# Patient Record
Sex: FEMALE | Race: WHITE | Employment: OTHER | URBAN - METROPOLITAN AREA
[De-identification: names, ages, dates, MRNs, and addresses within clinical notes are randomized per-mention and may not be internally consistent; named-entity substitution may affect disease eponyms.]

---

## 2019-06-08 ENCOUNTER — APPOINTMENT (OUTPATIENT)
Dept: GENERAL RADIOLOGY | Age: 84
End: 2019-06-08
Payer: MEDICARE

## 2019-06-08 ENCOUNTER — HOSPITAL ENCOUNTER (EMERGENCY)
Age: 84
Discharge: LEFT BEFORE DISCHARGE | End: 2019-06-08
Payer: MEDICARE

## 2019-06-08 VITALS
BODY MASS INDEX: 19.72 KG/M2 | SYSTOLIC BLOOD PRESSURE: 153 MMHG | HEIGHT: 67 IN | OXYGEN SATURATION: 100 % | DIASTOLIC BLOOD PRESSURE: 56 MMHG | TEMPERATURE: 97.8 F | WEIGHT: 125.66 LBS | RESPIRATION RATE: 8 BRPM | HEART RATE: 58 BPM

## 2019-06-08 DIAGNOSIS — R07.9 CHEST PAIN, UNSPECIFIED TYPE: Primary | ICD-10-CM

## 2019-06-08 LAB
A/G RATIO: 1.5 (ref 1.1–2.2)
ALBUMIN SERPL-MCNC: 4 G/DL (ref 3.4–5)
ALP BLD-CCNC: 75 U/L (ref 40–129)
ALT SERPL-CCNC: 26 U/L (ref 10–40)
ANION GAP SERPL CALCULATED.3IONS-SCNC: 11 MMOL/L (ref 3–16)
AST SERPL-CCNC: 26 U/L (ref 15–37)
BASOPHILS ABSOLUTE: 0.1 K/UL (ref 0–0.2)
BASOPHILS RELATIVE PERCENT: 0.9 %
BILIRUB SERPL-MCNC: 0.7 MG/DL (ref 0–1)
BILIRUBIN URINE: NEGATIVE
BLOOD, URINE: NEGATIVE
BUN BLDV-MCNC: 15 MG/DL (ref 7–20)
CALCIUM SERPL-MCNC: 9.6 MG/DL (ref 8.3–10.6)
CHLORIDE BLD-SCNC: 108 MMOL/L (ref 99–110)
CLARITY: CLEAR
CO2: 23 MMOL/L (ref 21–32)
COLOR: YELLOW
CREAT SERPL-MCNC: 1 MG/DL (ref 0.6–1.2)
EKG ATRIAL RATE: 62 BPM
EKG DIAGNOSIS: NORMAL
EKG P AXIS: 73 DEGREES
EKG P-R INTERVAL: 188 MS
EKG Q-T INTERVAL: 412 MS
EKG QRS DURATION: 90 MS
EKG QTC CALCULATION (BAZETT): 418 MS
EKG R AXIS: 31 DEGREES
EKG T AXIS: 63 DEGREES
EKG VENTRICULAR RATE: 62 BPM
EOSINOPHILS ABSOLUTE: 0.3 K/UL (ref 0–0.6)
EOSINOPHILS RELATIVE PERCENT: 3.9 %
EPITHELIAL CELLS, UA: 0 /HPF (ref 0–5)
GFR AFRICAN AMERICAN: >60
GFR NON-AFRICAN AMERICAN: 52
GLOBULIN: 2.7 G/DL
GLUCOSE BLD-MCNC: 89 MG/DL (ref 70–99)
GLUCOSE URINE: NEGATIVE MG/DL
HCT VFR BLD CALC: 35.8 % (ref 36–48)
HEMOGLOBIN: 11.7 G/DL (ref 12–16)
HYALINE CASTS: 1 /LPF (ref 0–8)
KETONES, URINE: NEGATIVE MG/DL
LEUKOCYTE ESTERASE, URINE: ABNORMAL
LYMPHOCYTES ABSOLUTE: 0.9 K/UL (ref 1–5.1)
LYMPHOCYTES RELATIVE PERCENT: 13.5 %
MCH RBC QN AUTO: 31.8 PG (ref 26–34)
MCHC RBC AUTO-ENTMCNC: 32.5 G/DL (ref 31–36)
MCV RBC AUTO: 97.8 FL (ref 80–100)
MICROSCOPIC EXAMINATION: YES
MONOCYTES ABSOLUTE: 0.5 K/UL (ref 0–1.3)
MONOCYTES RELATIVE PERCENT: 7.5 %
NEUTROPHILS ABSOLUTE: 5.2 K/UL (ref 1.7–7.7)
NEUTROPHILS RELATIVE PERCENT: 74.2 %
NITRITE, URINE: NEGATIVE
PDW BLD-RTO: 15.1 % (ref 12.4–15.4)
PH UA: 7 (ref 5–8)
PLATELET # BLD: 338 K/UL (ref 135–450)
PMV BLD AUTO: 7.9 FL (ref 5–10.5)
POTASSIUM SERPL-SCNC: 4.1 MMOL/L (ref 3.5–5.1)
PROTEIN UA: NEGATIVE MG/DL
RBC # BLD: 3.66 M/UL (ref 4–5.2)
RBC UA: 1 /HPF (ref 0–4)
SODIUM BLD-SCNC: 142 MMOL/L (ref 136–145)
SPECIFIC GRAVITY UA: 1.01 (ref 1–1.03)
TOTAL PROTEIN: 6.7 G/DL (ref 6.4–8.2)
TROPONIN: <0.01 NG/ML
URINE REFLEX TO CULTURE: YES
URINE TYPE: ABNORMAL
UROBILINOGEN, URINE: 0.2 E.U./DL
WBC # BLD: 7 K/UL (ref 4–11)
WBC UA: 10 /HPF (ref 0–5)

## 2019-06-08 PROCEDURE — 85025 COMPLETE CBC W/AUTO DIFF WBC: CPT

## 2019-06-08 PROCEDURE — 36415 COLL VENOUS BLD VENIPUNCTURE: CPT

## 2019-06-08 PROCEDURE — 87086 URINE CULTURE/COLONY COUNT: CPT

## 2019-06-08 PROCEDURE — 93010 ELECTROCARDIOGRAM REPORT: CPT | Performed by: INTERNAL MEDICINE

## 2019-06-08 PROCEDURE — 99285 EMERGENCY DEPT VISIT HI MDM: CPT

## 2019-06-08 PROCEDURE — 6370000000 HC RX 637 (ALT 250 FOR IP): Performed by: PHYSICIAN ASSISTANT

## 2019-06-08 PROCEDURE — 81001 URINALYSIS AUTO W/SCOPE: CPT

## 2019-06-08 PROCEDURE — 80053 COMPREHEN METABOLIC PANEL: CPT

## 2019-06-08 PROCEDURE — 84484 ASSAY OF TROPONIN QUANT: CPT

## 2019-06-08 PROCEDURE — 71046 X-RAY EXAM CHEST 2 VIEWS: CPT

## 2019-06-08 PROCEDURE — 93005 ELECTROCARDIOGRAM TRACING: CPT | Performed by: PHYSICIAN ASSISTANT

## 2019-06-08 RX ORDER — ASPIRIN 81 MG/1
81 TABLET, CHEWABLE ORAL NIGHTLY
COMMUNITY

## 2019-06-08 RX ORDER — NITROGLYCERIN 0.4 MG/1
0.4 TABLET SUBLINGUAL EVERY 5 MIN PRN
Status: DISCONTINUED | OUTPATIENT
Start: 2019-06-08 | End: 2019-06-08 | Stop reason: HOSPADM

## 2019-06-08 RX ORDER — PILOCARPINE HYDROCHLORIDE 20 MG/ML
1 SOLUTION/ DROPS OPHTHALMIC 2 TIMES DAILY
COMMUNITY
Start: 2019-04-22

## 2019-06-08 RX ORDER — DORZOLAMIDE HYDROCHLORIDE AND TIMOLOL MALEATE 20; 5 MG/ML; MG/ML
1 SOLUTION/ DROPS OPHTHALMIC 2 TIMES DAILY
COMMUNITY
Start: 2019-04-22

## 2019-06-08 RX ORDER — PYRIDOXINE HCL (VITAMIN B6) 100 MG
100 TABLET ORAL DAILY
COMMUNITY

## 2019-06-08 RX ORDER — M-VIT,TX,IRON,MINS/CALC/FOLIC 27MG-0.4MG
1 TABLET ORAL DAILY
COMMUNITY
End: 2022-01-24 | Stop reason: ALTCHOICE

## 2019-06-08 RX ORDER — MULTIVIT WITH MINERALS/LUTEIN
1000 TABLET ORAL DAILY
COMMUNITY

## 2019-06-08 RX ORDER — LATANOPROST 50 UG/ML
1 SOLUTION/ DROPS OPHTHALMIC NIGHTLY
COMMUNITY

## 2019-06-08 RX ORDER — ASPIRIN 81 MG/1
243 TABLET, CHEWABLE ORAL ONCE
Status: COMPLETED | OUTPATIENT
Start: 2019-06-08 | End: 2019-06-08

## 2019-06-08 RX ADMIN — ASPIRIN 81 MG 243 MG: 81 TABLET ORAL at 11:16

## 2019-06-08 RX ADMIN — NITROGLYCERIN 0.4 MG: 0.4 TABLET, ORALLY DISINTEGRATING SUBLINGUAL at 11:56

## 2019-06-08 ASSESSMENT — ENCOUNTER SYMPTOMS
CHOKING: 0
SORE THROAT: 0
VOMITING: 0
NAUSEA: 0
ABDOMINAL PAIN: 0
SHORTNESS OF BREATH: 0
FACIAL SWELLING: 0
BACK PAIN: 0
APNEA: 0
EYE DISCHARGE: 0
EYE REDNESS: 0

## 2019-06-08 ASSESSMENT — PAIN SCALES - GENERAL
PAINLEVEL_OUTOF10: 4
PAINLEVEL_OUTOF10: 4
PAINLEVEL_OUTOF10: 0

## 2019-06-08 ASSESSMENT — PAIN DESCRIPTION - PAIN TYPE
TYPE: ACUTE PAIN
TYPE: ACUTE PAIN

## 2019-06-08 ASSESSMENT — PAIN DESCRIPTION - LOCATION
LOCATION: CHEST
LOCATION: CHEST

## 2019-06-08 ASSESSMENT — PAIN DESCRIPTION - ONSET
ONSET: ON-GOING
ONSET: ON-GOING

## 2019-06-08 ASSESSMENT — PAIN DESCRIPTION - DESCRIPTORS
DESCRIPTORS: PRESSURE
DESCRIPTORS: PRESSURE

## 2019-06-08 ASSESSMENT — PAIN DESCRIPTION - FREQUENCY
FREQUENCY: CONTINUOUS
FREQUENCY: CONTINUOUS

## 2019-06-08 ASSESSMENT — PAIN DESCRIPTION - PROGRESSION
CLINICAL_PROGRESSION: NOT CHANGED
CLINICAL_PROGRESSION: NOT CHANGED

## 2019-06-08 NOTE — ED TRIAGE NOTES
Pt to ED with c/o chest pain. States she woke up to go to the restroom and her chest was hurting. Describes the pain as pressure. Denies cardiac history, denies n/v/d, denies numbness/tingling anywhere.

## 2019-06-08 NOTE — LETTER
[facility] Emergency Department  [address] [place] [id number]  Phone: [phone]    Patient: Shweta Cowan  YOB: 1932  Date: 6/8/2019 Time: 3:43 PM    Leaving the Hospital Against Medical Advice    Chart #:[id number]    This will certify that I, the undersigned,    ______________________________________________________________________    A patient in the above named medical center, having requested discharge and removal from the medical center against the advice of my attending physician(s), hereby release the Emergency Department, its physicians, officers and employees, severally and individually, from any and all liability of any nature whatsoever for any injury or harm or complication of any kind that may result directly or indirectly, by reason of my terminating my stay as a patient from [facility], and hereby waive any and all rights of action I may now have or later acquire as a result of my voluntary departure from [facility] and the termination of my stay as a patient therein. This release is made with the full knowledge of the danger that may result from the action which I am taking.       Date:_______________________                         ___________________________                                                                                    Patient/Legal Representative    Witness:        ____________________________                          ___________________________  Nurse                                                                        Physician

## 2019-06-08 NOTE — ED PROVIDER NOTES
[facility]      Pt Name: Tamika Gamboa  XKD:[id number]  Birthdate 3/18/1932  Date of evaluation: 6/8/2019  Provider: Ellen Barahona PA-C     This patient was seen and evaluated by the attending physician Dr. Bettye Heimlich, M.D. Chief Complaint:    Chief Complaint   Patient presents with    Chest Pain     states she woke up to use the restroom and had chest pain/pressure       Nursing Notes, Past Medical Hx, Past Surgical Hx, Social Hx, Allergies, and Family Hx were all reviewed and agreed with or any disagreements were addressed in the HPI.    HPI:  (Location, Duration, Timing, Severity,Quality, Assoc Sx, Context, Modifying factors)  This is a  80 y.o. female complaining of chest pressure. States she woke up to use the restroom her pressure started. No nausea vomiting associated with no abdominal pain. Pressure does not go straight to her back. Does not radiate down her arm into her neck. No diaphoresis associated with it. Patient denies a previous cardiac history, no history of stroke. Denies history of high blood pressure diabetes. No high cholesterol. She does take a baby aspirin a day. She did take one this morning. No headaches, no extremity weakness. No other complaints.       PastMedical/Surgical History:      Diagnosis Date    Glaucoma          Procedure Laterality Date    ANTERIOR CRUCIATE LIGAMENT REPAIR Right     APPENDECTOMY      CHOLECYSTECTOMY      COLON SURGERY  1989    EYE SURGERY      cataract       Medications:  Previous Medications    ASCORBIC ACID (VITAMIN C) 1000 MG TABLET    Take by mouth daily    ASPIRIN 81 MG CHEWABLE TABLET    Take 81 mg by mouth daily    BIOTIN PO    Take by mouth    CALCIUM-VITAMIN D PO    Take by mouth daily    CYANOCOBALAMIN 1000 MCG TABLET    Take 1,000 mcg by mouth daily    DORZOLAMIDE-TIMOLOL (COSOPT) 22.3-6.8 MG/ML OPHTHALMIC SOLUTION    Place into both eyes 2 times daily 5 drops in the right eye and 2 drops in the left eye    LATANOPROST (XALATAN) 0.005 % OPHTHALMIC SOLUTION    Place into both eyes nightly     MULTIPLE VITAMINS-MINERALS (THERAPEUTIC MULTIVITAMIN-MINERALS) TABLET    Take 1 tablet by mouth daily    MULTIPLE VITAMINS-MINERALS (VITEYES AREDS FORMULA) CAPS    Take by mouth    OMEGA-3 FATTY ACIDS PO    Take by mouth daily    PILOCARPINE (PILOCAR) 2 % OPHTHALMIC SOLUTION    Place into both eyes 2 times daily    PYRIDOXINE (B-6) 100 MG TABLET    Take 100 mg by mouth daily    VITAMIN D (CHOLECALCIFEROL) 1000 UNITS TABS TABLET    Take 1,000 Units by mouth daily         Review of Systems:  Review of Systems   Constitutional: Negative for chills and fever. HENT: Negative for congestion, facial swelling and sore throat. Eyes: Negative for discharge and redness. Respiratory: Negative for apnea, choking and shortness of breath. Cardiovascular: Positive for chest pain. Gastrointestinal: Negative for abdominal pain, nausea and vomiting. Genitourinary: Negative for dysuria. Musculoskeletal: Negative for back pain, neck pain and neck stiffness. Neurological: Negative for dizziness, tremors, seizures, weakness and headaches. All other systems reviewed and are negative. Positives and Pertinent negatives as per HPI. Except as noted above in the ROS, problem specific ROS was completed and is negative. Physical Exam:  Physical Exam   Constitutional: She is oriented to person, place, and time. She appears well-developed and well-nourished. HENT:   Head: Normocephalic and atraumatic. Nose: Nose normal.   Eyes: Right eye exhibits no discharge. Left eye exhibits no discharge. Neck: Normal range of motion. Neck supple. Cardiovascular: Normal rate, regular rhythm and normal heart sounds. Exam reveals no gallop and no friction rub. No murmur heard. Pulmonary/Chest: Effort normal and breath sounds normal. No respiratory distress. She has no wheezes.  She has no rales. She exhibits no tenderness. Abdominal: Soft. Bowel sounds are normal. She exhibits no distension and no mass. There is no tenderness. There is no guarding. Musculoskeletal: Normal range of motion. Neurological: She is alert and oriented to person, place, and time. Skin: Skin is warm and dry. She is not diaphoretic. Psychiatric: She has a normal mood and affect. Her behavior is normal.   Nursing note and vitals reviewed.       MEDICAL DECISION MAKING    Vitals:    Vitals:    06/08/19 1341 06/08/19 1401 06/08/19 1441 06/08/19 1455   BP: (!) 152/56 (!) 163/63 (!) 158/61    Pulse: 55 60 59 61   Resp: 19 15 8 10   Temp:       TempSrc:       SpO2: 100% 97% 100% 100%   Weight:       Height:           LABS:  Labs Reviewed   CBC WITH AUTO DIFFERENTIAL - Abnormal; Notable for the following components:       Result Value    RBC 3.66 (*)     Hemoglobin 11.7 (*)     Hematocrit 35.8 (*)     Lymphocytes # 0.9 (*)     All other components within normal limits    Narrative:     Performed at:  [address] [place] [address]   Phone [phone]   COMPREHENSIVE METABOLIC PANEL - Abnormal; Notable for the following components:    GFR Non- 52 (*)     All other components within normal limits    Narrative:     Performed at:  [address] [place] [address]   Phone [phone]   URINE RT REFLEX TO CULTURE - Abnormal; Notable for the following components:    Leukocyte Esterase, Urine SMALL (*)     All other components within normal limits    Narrative:     Performed at:  [address] [place] [address]   Phone [phone]   MICROSCOPIC URINALYSIS - Abnormal; Notable for the following components:    WBC, UA 10 (*)     All other components within normal limits    Narrative:     Performed at:  [facility]  [address] [place], [place] [address]   Phone [phone]   URINE CULTURE   TROPONIN    Narrative:     Performed at:  [facility]  [address] [place], [place] [address]   Phone [phone] of labs reviewed and werenegative at this time or not returned at the time of this note. RADIOLOGY:   Non-plain film images such as CT, Ultrasound and MRI are read by the radiologist. Jade Easton PA-C have directly visualized the radiologic plain film image(s) with the below findings:        Interpretation per the Radiologist below, if available at the time of thisnote:    XR CHEST STANDARD (2 VW)   Final Result   COPD without acute disease. No results found. MEDICAL DECISION MAKING / ED COURSE:      PROCEDURES:   Procedures    None    Patient was given:  Medications   nitroGLYCERIN (NITROSTAT) SL tablet 0.4 mg (0.4 mg Sublingual Given 6/8/19 1156)   aspirin chewable tablet 243 mg (243 mg Oral Given 6/8/19 1116)       Emergency room course: Patient on exam throat is clear without erythematous or exudative. Pupils equal round and reactive to light S or ocular movement is intact. Neck is supple full range of motion without tenderness. No midline tenderness cervical, thoracic or lumbar spine. Cardiovascular regular rate and rhythm without rub murmur or gallop. Lungs are clear no wheeze, rales or rhonchi. Mild reproducible chest wall tenderness upper chest wall. No bruising noted on the chest wall. The step-off or crepitus noted. Abdomen soft nontender. Normal bowel sounds all 4 quadrants nondistended. No CVA or flank tenderness. No palpable mass. No bruits noted with auscultation. Full range of motion all extremity. Neurologically there is no motor or sensory deficit noted. No focal deficits. She is alert and oriented ×4. Does not appear to be in acute distress.       Patient was given nitroglycerin here in the ED for the tightness in her chest. After one dose that did relieve the tightness. Urinalysis is negative for nitrites does show small leukocytes negative for blood and for ketones. Microscopically WBC of 10, RBC of one epithelial cells of zero and I like as of one. She is not having urinary symptoms so I will wait for cultures to treat. CBC has a white count 7.0, RBC 3.66, hemoglobin 11.7 and hematocrit 35.8 CMP within normal limits with a BUN of 15 and creatinine 1.0. Normal transaminases. Troponin less than 0.01. Chest x-ray shows COPD without acute disease    At this time since the nitroglycerin did help with the patient chest pressure I did recommend the patient stay to be admitted for cardiac rule out. The patient refuses. She talked to her son try to get her to stay as well but she refused again. I gave her a few minutes to think about it she talked to her daughter and she still refuses. At this time I informed the patient she was to sign out against medical advise. I will still give her cardiology referral. Instructed to return for any increased chest pain or tightness. She states she would. Her son said that they'll be there watching her. Patient was aware of all the risks. 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 The patient tolerated their visit well. I evaluated the patient. The physician was available for consultation as needed. The patient and / or the family were informed of the results of anytests, a time was given to answer questions, a plan was proposed and they agreed with plan. CLINICAL IMPRESSION:  1.  Chest pain, unspecified type        DISPOSITION  DISPOSITION [place] 06/08/2019 03:14:46 PM          PATIENT REFERRED TO:  Cornell Gordon MD  [facility] [address] [facility],[address]  [phone]    Call in 2 days      Chasity Tomlin MD  [address] [facility] Lyssa Brasher [address] [place] [address]  [phone]    Call in 2 days        DISCHARGE MEDICATIONS:  New Prescriptions    No medications on file       DISCONTINUED MEDICATIONS:  Discontinued Medications    No medications on file              (Please note the MDM and HPI sections of this note were completed with a voice recognition program.  Efforts weremade to edit the dictations but occasionally words are mis-transcribed.)    Electronically signed, Ellen Barahona PA-C,          Ellen Barahona PA-C  06/08/19 6395

## 2019-06-08 NOTE — ED NOTES
Patient ambulated to bathroom and back with steady gait. Patient tolerated well. Urine specimen obtained and sent to lab.      Bianca Gasca RN  06/08/19 [id number]

## 2019-06-08 NOTE — ED PROVIDER NOTES
note were completed with a voice recognition program.  Efforts were made to edit the dictations but occasionally words are mis-transcribed.)    Ruma Nichole MD  Attending Emergency Physician        Bette Humphreys MD  06/08/19 7453

## 2019-06-08 NOTE — PROGRESS NOTES
Pharmacy Medication Reconciliation Note     List of medications patient is currently taking is complete. Source of information:   1. Patient   2. EMR    Notes regarding home medications:   1. Patient received some of her morning home medication doses before presenting to the ER.       [facility] [place], Pharmacy Intern  6/8/2019 12:59 PM

## 2019-06-09 LAB — URINE CULTURE, ROUTINE: NORMAL

## 2019-06-10 DIAGNOSIS — R07.9 CHEST PAIN, UNSPECIFIED TYPE: Primary | ICD-10-CM

## 2019-06-11 ENCOUNTER — HOSPITAL ENCOUNTER (OUTPATIENT)
Dept: NON INVASIVE DIAGNOSTICS | Age: 84
Discharge: HOME | End: 2019-06-11
Payer: MEDICARE

## 2019-06-11 DIAGNOSIS — R07.9 CHEST PAIN, UNSPECIFIED TYPE: ICD-10-CM

## 2019-06-11 LAB
LV EF: 68 %
LVEF MODALITY: NORMAL

## 2019-06-11 PROCEDURE — 93017 CV STRESS TEST TRACING ONLY: CPT

## 2019-06-11 PROCEDURE — 3430000000 HC RX DIAGNOSTIC RADIOPHARMACEUTICAL: Performed by: INTERNAL MEDICINE

## 2019-06-11 PROCEDURE — 78452 HT MUSCLE IMAGE SPECT MULT: CPT

## 2019-06-11 PROCEDURE — A9502 TC99M TETROFOSMIN: HCPCS | Performed by: INTERNAL MEDICINE

## 2019-06-11 RX ADMIN — TETROFOSMIN 30 MILLICURIE: 1.38 INJECTION, POWDER, LYOPHILIZED, FOR SOLUTION INTRAVENOUS at 10:36

## 2019-06-11 RX ADMIN — TETROFOSMIN 10 MILLICURIE: 1.38 INJECTION, POWDER, LYOPHILIZED, FOR SOLUTION INTRAVENOUS at 09:06

## 2019-06-28 ENCOUNTER — OFFICE VISIT (OUTPATIENT)
Dept: CARDIOLOGY CLINIC | Age: 84
End: 2019-06-28
Payer: MEDICARE

## 2019-06-28 VITALS
HEART RATE: 72 BPM | HEIGHT: 67 IN | SYSTOLIC BLOOD PRESSURE: 162 MMHG | WEIGHT: 122 LBS | BODY MASS INDEX: 19.15 KG/M2 | DIASTOLIC BLOOD PRESSURE: 88 MMHG | OXYGEN SATURATION: 94 %

## 2019-06-28 DIAGNOSIS — R07.9 CHEST PAIN, UNSPECIFIED TYPE: Primary | ICD-10-CM

## 2019-06-28 DIAGNOSIS — R03.0 ELEVATED BLOOD PRESSURE READING: ICD-10-CM

## 2019-06-28 PROCEDURE — G8420 CALC BMI NORM PARAMETERS: HCPCS | Performed by: INTERNAL MEDICINE

## 2019-06-28 PROCEDURE — 1090F PRES/ABSN URINE INCON ASSESS: CPT | Performed by: INTERNAL MEDICINE

## 2019-06-28 PROCEDURE — 93000 ELECTROCARDIOGRAM COMPLETE: CPT | Performed by: INTERNAL MEDICINE

## 2019-06-28 PROCEDURE — 99204 OFFICE O/P NEW MOD 45 MIN: CPT | Performed by: INTERNAL MEDICINE

## 2019-06-28 PROCEDURE — G8427 DOCREV CUR MEDS BY ELIG CLIN: HCPCS | Performed by: INTERNAL MEDICINE

## 2019-06-28 NOTE — PATIENT INSTRUCTIONS
to do what your doctor recommends, such as going to all suggested follow-up appointments and taking medicines exactly as directed. This will help you recover and help prevent future problems. How can you care for yourself at home? · Rest until you feel better. · Take your medicine exactly as prescribed. Call your doctor if you think you are having a problem with your medicine. · Do not drive after taking a prescription pain medicine. When should you call for help? Call 911 if:    · You passed out (lost consciousness).     · You have severe difficulty breathing.     · You have symptoms of a heart attack. These may include:  ? Chest pain or pressure, or a strange feeling in your chest.  ? Sweating. ? Shortness of breath. ? Nausea or vomiting. ? Pain, pressure, or a strange feeling in your back, neck, jaw, or upper belly or in one or both shoulders or arms. ? Lightheadedness or sudden weakness. ? A fast or irregular heartbeat. After you call 911, the  may tell you to chew 1 adult-strength or 2 to 4 low-dose aspirin. Wait for an ambulance. Do not try to drive yourself.    Call your doctor today if:    · You have any trouble breathing.     · Your chest pain gets worse.     · You are dizzy or lightheaded, or you feel like you may faint.     · You are not getting better as expected.     · You are having new or different chest pain. Where can you learn more? Go to https://Vita Products.[employer]. org and sign in to your [employer] account. Enter A120 in the [employer] box to learn more about \"Chest Pain: Care Instructions. \"     If you do not have an account, please click on the \"Sign Up Now\" link. Current as of: September 23, 2018  Content Version: 12.0  © 4674-3467 Healthwise, Incorporated. Care instructions adapted under license by [facility] ([facility]).  If you have questions about a medical condition or this instruction, always ask your healthcare professional. Jonathon Calderon disclaims any warranty or liability for your use of this information. Patient Education        Heart-Healthy Diet: Care Instructions  Your Care Instructions    A heart-healthy diet has lots of vegetables, fruits, nuts, beans, and whole grains, and is low in salt. It limits foods that are high in saturated fat, such as meats, cheeses, and fried foods. It may be hard to change your diet, but even small changes can lower your risk of heart attack and heart disease. Follow-up care is a key part of your treatment and safety. Be sure to make and go to all appointments, and call your doctor if you are having problems. It's also a good idea to know your test results and keep a list of the medicines you take. How can you care for yourself at home? Watch your portions  · Learn what a serving is. A \"serving\" and a \"portion\" are not always the same thing. Make sure that you are not eating larger portions than are recommended. For example, a serving of pasta is ½ cup. A serving size of meat is 2 to 3 ounces. A 3-ounce serving is about the size of a deck of cards. Measure serving sizes until you are good at [place]" them. Keep in mind that restaurants often serve portions that are 2 or 3 times the size of one serving. · To keep your energy level up and keep you from feeling hungry, eat often but in smaller portions. · Eat only the number of calories you need to stay at a healthy weight. If you need to lose weight, eat fewer calories than your body burns (through exercise and other physical activity). Eat more fruits and vegetables  · Eat a variety of fruit and vegetables every day. Dark green, deep orange, red, or yellow fruits and vegetables are especially good for you. Examples include spinach, carrots, peaches, and berries. · Keep carrots, celery, and other veggies handy for snacks. Buy fruit that is in season and store it where you can see it so that you will be tempted to eat it.   · Cook dishes that have a lot of veggies in them, such as stir-fries and soups. Limit saturated and trans fat  · Read food labels, and try to avoid saturated and trans fats. They increase your risk of heart disease. Trans fat is found in many processed foods such as cookies and crackers. · Use olive or canola oil when you cook. Try cholesterol-lowering spreads, such as Benecol or Take Control. · Bake, broil, grill, or steam foods instead of frying them. · Choose lean meats instead of high-fat meats such as hot dogs and sausages. Cut off all visible fat when you prepare meat. · Eat fish, skinless poultry, and meat alternatives such as soy products instead of high-fat meats. Soy products, such as tofu, may be especially good for your heart. · Choose low-fat or fat-free milk and dairy products. Eat fish  · Eat at least two servings of fish a week. Certain fish, such as salmon and tuna, contain omega-3 fatty acids, which may help reduce your risk of heart attack. Eat foods high in fiber  · Eat a variety of grain products every day. Include whole-grain foods that have lots of fiber and nutrients. Examples of whole-grain foods include oats, whole wheat bread, and brown rice. · Buy whole-grain breads and cereals, instead of white bread or pastries. Limit salt and sodium  · Limit how much salt and sodium you eat to help lower your blood pressure. · Taste food before you salt it. Add only a little salt when you think you need it. With time, your taste buds will adjust to less salt. · Eat fewer snack items, fast foods, and other high-salt, processed foods. Check food labels for the amount of sodium in packaged foods. · Choose low-sodium versions of canned goods (such as soups, vegetables, and beans). Limit sugar  · Limit drinks and foods with added sugar. These include candy, desserts, and soda pop. Limit alcohol  · Limit alcohol to no more than 2 drinks a day for men and 1 drink a day for women.  Too much alcohol can cause health problems. When should you call for help? Watch closely for changes in your health, and be sure to contact your doctor if:    · You would like help planning heart-healthy meals. Where can you learn more? Go to https://Space Racechristineb.Miira. org and sign in to your [employer] account. Enter V137 in the [facility] box to learn more about \"Heart-Healthy Diet: Care Instructions. \"     If you do not have an account, please click on the \"Sign Up Now\" link. Current as of: July 22, 2018  Content Version: 12.0  © 7727-0497 Healthwise, Incorporated. Care instructions adapted under license by [facility] ([facility]). If you have questions about a medical condition or this instruction, always ask your healthcare professional. [address] any warranty or liability for your use of this information.

## 2019-06-28 NOTE — LETTER
[facility] [address] [place] [address]  Phone: [phone]  Fax: [phone]            June 28, 2019       Patient: Lucrecia Tan   MR Number: [id number]   YOB: 1932   Date of Visit: 6/28/2019       Dear Dr. Lindsey Arriaga: Thank you for the request for consultation for Yuliana Jaylen to me. Below are the relevant portions of my assessment and plan of care. The patient is 80 y.o. female with no past medical history. She presented to the ED on 6/8/19 with chest pain and presents today for follow up. She states when she presented to the ED she was having chest pressure and tightness in her back. She woke up with this pain. She was given a nitro in the ED and this did not help her pain much. She states the pain lasted for a couple days. She does play golf twice per week. She will walk 9 holes one of the nights she plays. She denies chest pain with this exertion. She denies experiencing any chest pain during her previous stress test.     She has been monitoring her blood pressure at home for borderline hypertension. She has not been placed on any medication for this as of yet. Review of Systems:  Constitutional: No fatigue, weakness, night sweats or fever. HEENT: No new vision difficulties or ringing in the ears. Respiratory: No new SOB, PND, orthopnea or cough. Cardiovascular: See HPI   GI: No n/v, diarrhea, constipation, abdominal pain or changes in bowel habits. No melena, no hematochezia  : No urinary frequency, urgency, incontinence, hematuria or dysuria. Skin: No cyanosis or skin lesions. Musculoskeletal: No new muscle or joint pain. Neurological: No syncope or TIA-like symptoms.   Psychiatric: No anxiety, insomnia or depression     Past Medical History:   Diagnosis Date    Glaucoma      Past Surgical History:   Procedure Laterality Date 2. Elevated Blood Pressure    Plan:  She has not had any recurrence of chest pain and her recent stress test was normal. The chest pain was atypical lasting for several days and dull in nature. Her exercise capacity is good and she has few risk factors for coronary disease. Her most recent lipid profile is well controlled. She can follow up with Dr. Ivory Edgar regarding her recently elevated blood pressure. I did tell her that follow-up of this is important to reduce her risk of heart attack and stroke. Her lipid profile is quite favorable. I have encouraged her to call the office with any exertional chest pain. I can see her at any time for new cardiac symptoms. If you have questions, please do not hesitate to call me. I look forward to following Ebenezer Rucker along with you.     Sincerely,        Lisset Dukes MD     providers:  Mikel Raymond, [address] [place] [id number]  VIA Mail     VANCE Ruiz [phone]  VIA [place]

## 2019-06-28 NOTE — PROGRESS NOTES
Ra  3/18/1932    June 28, 2019    Reason for Consult: Chest pain     CC: Chest pain     HPI:  The patient is 80 y.o. female with no past medical history. She presented to the ED on 6/8/19 with chest pain and presents today for follow up. She states when she presented to the ED she was having chest pressure and tightness in her back. She woke up with this pain. She was given a nitro in the ED and this did not help her pain much. She states the pain lasted for a couple days. She does play golf twice per week. She will walk 9 holes one of the nights she plays. She denies chest pain with this exertion. She denies experiencing any chest pain during her previous stress test.     She has been monitoring her blood pressure at home for borderline hypertension. She has not been placed on any medication for this as of yet. Review of Systems:  Constitutional: No fatigue, weakness, night sweats or fever. HEENT: No new vision difficulties or ringing in the ears. Respiratory: No new SOB, PND, orthopnea or cough. Cardiovascular: See HPI   GI: No n/v, diarrhea, constipation, abdominal pain or changes in bowel habits. No melena, no hematochezia  : No urinary frequency, urgency, incontinence, hematuria or dysuria. Skin: No cyanosis or skin lesions. Musculoskeletal: No new muscle or joint pain. Neurological: No syncope or TIA-like symptoms. Psychiatric: No anxiety, insomnia or depression     Past Medical History:   Diagnosis Date    Glaucoma      Past Surgical History:   Procedure Laterality Date    ANTERIOR CRUCIATE LIGAMENT REPAIR Right     APPENDECTOMY      CHOLECYSTECTOMY      COLON SURGERY  1989    EYE SURGERY      cataract     No family history on file.   Social History     Tobacco Use    Smoking status: Never Smoker    Smokeless tobacco: Never Used   Substance Use Topics    Alcohol use: Yes     Comment: occassionally    Drug use: Never Allergies   Allergen Reactions    Codeine Nausea Only     \"feels like I'm going to pass out\"     Current Outpatient Medications   Medication Sig Dispense Refill    aspirin 81 MG chewable tablet Take 81 mg by mouth daily      Ascorbic Acid (VITAMIN C) 1000 MG tablet Take by mouth daily      pyridoxine (B-6) 100 MG tablet Take 100 mg by mouth daily      latanoprost (XALATAN) 0.005 % ophthalmic solution Place into both eyes nightly       cyanocobalamin 1000 MCG tablet Take 1,000 mcg by mouth daily      vitamin D (CHOLECALCIFEROL) 1000 units TABS tablet Take 1,000 Units by mouth daily      dorzolamide-timolol (COSOPT) 22.3-6.8 MG/ML ophthalmic solution Place into both eyes 2 times daily 5 drops in the right eye and 2 drops in the left eye      pilocarpine (PILOCAR) 2 % ophthalmic solution Place into both eyes 2 times daily      Multiple Vitamins-Minerals (THERAPEUTIC MULTIVITAMIN-MINERALS) tablet Take 1 tablet by mouth daily      OMEGA-3 FATTY ACIDS PO Take by mouth daily      CALCIUM-VITAMIN D PO Take by mouth daily      Multiple Vitamins-Minerals (VITEYES AREDS FORMULA) CAPS Take by mouth      BIOTIN PO Take by mouth       No current facility-administered medications for this visit. Physical Exam:   BP (!) 162/88 (Site: Right Upper Arm, Position: Sitting)   Pulse 72   Ht 5' 7\" (1.702 m)   Wt 122 lb (55.3 kg)   SpO2 94%   BMI 19.11 kg/m²   No intake or output data in the 24 hours ending 06/28/19 1404  Wt Readings from Last 2 Encounters:   06/28/19 122 lb (55.3 kg)   06/08/19 125 lb 10.6 oz (57 kg)     Constitutional: She is oriented to person, place, and time. She appears well-developed and well-nourished. In no acute distress. Head: Normocephalic and atraumatic. Neck: Neck supple. No JVD present. Carotid bruit is not present. No mass and no thyromegaly present. No lymphadenopathy present. Cardiovascular: Normal rate, regular rhythm, normal heart sounds and intact distal pulses. Exam reveals no gallop and no friction rub. No murmur heard. Pulmonary/Chest: Effort normal and breath sounds normal. No respiratory distress. She has no wheezes, rhonchi or rales. Abdominal: Soft, non-tender. Bowel sounds and aorta are normal. She exhibits no organomegaly, mass or bruit. Extremities: No edema, cyanosis, or clubbing. Pulses are 2+ radial/carotid/dorsalis pedis and posterior tibial bilaterally. Neurological: She is alert and oriented to person, place, and time. She has normal reflexes. No cranial nerve deficit. Coordination normal.   Skin: Skin is warm and dry. There is no rash or diaphoresis. Psychiatric: She has a normal mood and affect. Her speech is normal and behavior is normal.     EKG Interpretation 6/28/19: Normal sinus rhythm       Stress perfusion test 6/11/19   This is technically a satisfactory study.    Non diagnostic treadmill maximal exercise stress test.    No evidence of ischemia by myocardial perfusion imaging.    Gated study shows normal LV size with calculated EF of 68 %.    Patient exercised for 5.30min on a Sridhar protocol to achieve a HR of 109 ,    which is 82 % of PMHR. The study was stopped due to physical exhaustion.   Letty Leventhal was no chest pain or ischemic ST changes. Lab Review:   No results found for: TRIG, HDL, LDLCALC, LDLDIRECT, LABVLDL  Lab Results   Component Value Date     06/08/2019    K 4.1 06/08/2019    BUN 15 06/08/2019    CREATININE 1.0 06/08/2019   3/25/19 [facility]  Trig 88 HDL 80 LDL 98        Assessment:  1. Chest pain,atypical    2. Elevated Blood Pressure    Plan:  She has not had any recurrence of chest pain and her recent stress test was normal. The chest pain was atypical lasting for several days and dull in nature. Her exercise capacity is good and she has few risk factors for coronary disease. Her most recent lipid profile is well controlled. She can follow up with Dr. Timur Rodrigues regarding her recently elevated blood pressure.  I did tell her that follow-up of this is important to reduce her risk of heart attack and stroke. Her lipid profile is quite favorable. I have encouraged her to call the office with any exertional chest pain. I can see her at any time for new cardiac symptoms. This note was scribed in the presence of Tashia Cruz MD by [employer], RN. Physician Attestation:  The scribes documentation has been prepared under my direction and personally reviewed by me in its entirety. I, Dr. Shawanda Harris personally performed the services described in this documentation as scribed by my RN,  [employer] in my presence, and I confirm that the note above accurately reflects all work, treatment, procedures, and medical decision making performed by me.

## 2019-06-28 NOTE — COMMUNICATION BODY
Allergies   Allergen Reactions    Codeine Nausea Only     \"feels like I'm going to pass out\"     Current Outpatient Medications   Medication Sig Dispense Refill    aspirin 81 MG chewable tablet Take 81 mg by mouth daily      Ascorbic Acid (VITAMIN C) 1000 MG tablet Take by mouth daily      pyridoxine (B-6) 100 MG tablet Take 100 mg by mouth daily      latanoprost (XALATAN) 0.005 % ophthalmic solution Place into both eyes nightly       cyanocobalamin 1000 MCG tablet Take 1,000 mcg by mouth daily      vitamin D (CHOLECALCIFEROL) 1000 units TABS tablet Take 1,000 Units by mouth daily      dorzolamide-timolol (COSOPT) 22.3-6.8 MG/ML ophthalmic solution Place into both eyes 2 times daily 5 drops in the right eye and 2 drops in the left eye      pilocarpine (PILOCAR) 2 % ophthalmic solution Place into both eyes 2 times daily      Multiple Vitamins-Minerals (THERAPEUTIC MULTIVITAMIN-MINERALS) tablet Take 1 tablet by mouth daily      OMEGA-3 FATTY ACIDS PO Take by mouth daily      CALCIUM-VITAMIN D PO Take by mouth daily      Multiple Vitamins-Minerals (VITEYES AREDS FORMULA) CAPS Take by mouth      BIOTIN PO Take by mouth       No current facility-administered medications for this visit. Physical Exam:   BP (!) 162/88 (Site: Right Upper Arm, Position: Sitting)   Pulse 72   Ht 5' 7\" (1.702 m)   Wt 122 lb (55.3 kg)   SpO2 94%   BMI 19.11 kg/m²   No intake or output data in the 24 hours ending 06/28/19 1404  Wt Readings from Last 2 Encounters:   06/28/19 122 lb (55.3 kg)   06/08/19 125 lb 10.6 oz (57 kg)     Constitutional: She is oriented to person, place, and time. She appears well-developed and well-nourished. In no acute distress. Head: Normocephalic and atraumatic. Neck: Neck supple. No JVD present. Carotid bruit is not present. No mass and no thyromegaly present. No lymphadenopathy present. Cardiovascular: Normal rate, regular rhythm, normal heart sounds and intact distal pulses. Exam reveals no gallop and no friction rub. No murmur heard. Pulmonary/Chest: Effort normal and breath sounds normal. No respiratory distress. She has no wheezes, rhonchi or rales. Abdominal: Soft, non-tender. Bowel sounds and aorta are normal. She exhibits no organomegaly, mass or bruit. Extremities: No edema, cyanosis, or clubbing. Pulses are 2+ radial/carotid/dorsalis pedis and posterior tibial bilaterally. Neurological: She is alert and oriented to person, place, and time. She has normal reflexes. No cranial nerve deficit. Coordination normal.   Skin: Skin is warm and dry. There is no rash or diaphoresis. Psychiatric: She has a normal mood and affect. Her speech is normal and behavior is normal.     EKG Interpretation 6/28/19: Normal sinus rhythm       Stress perfusion test 6/11/19   This is technically a satisfactory study.    Non diagnostic treadmill maximal exercise stress test.    No evidence of ischemia by myocardial perfusion imaging.    Gated study shows normal LV size with calculated EF of 68 %.    Patient exercised for 5.30min on a Sridhar protocol to achieve a HR of 109 ,    which is 82 % of PMHR. The study was stopped due to physical exhaustion.   Vicie Pastel was no chest pain or ischemic ST changes. Lab Review:   No results found for: TRIG, HDL, LDLCALC, LDLDIRECT, LABVLDL  Lab Results   Component Value Date     06/08/2019    K 4.1 06/08/2019    BUN 15 06/08/2019    CREATININE 1.0 06/08/2019   3/25/19 [facility]  Trig 88 HDL 80 LDL 98        Assessment:  1. Chest pain,atypical    2. Elevated Blood Pressure    Plan:  She has not had any recurrence of chest pain and her recent stress test was normal. The chest pain was atypical lasting for several days and dull in nature. Her exercise capacity is good and she has few risk factors for coronary disease. Her most recent lipid profile is well controlled. She can follow up with Dr. Patrick Irizarry regarding her recently elevated blood pressure.  I did tell her that follow-up of this is important to reduce her risk of heart attack and stroke. Her lipid profile is quite favorable. I have encouraged her to call the office with any exertional chest pain. I can see her at any time for new cardiac symptoms.

## 2019-10-11 ENCOUNTER — APPOINTMENT (OUTPATIENT)
Dept: GENERAL RADIOLOGY | Age: 84
DRG: 440 | End: 2019-10-11
Payer: MEDICARE

## 2019-10-11 ENCOUNTER — APPOINTMENT (OUTPATIENT)
Dept: ULTRASOUND IMAGING | Age: 84
DRG: 440 | End: 2019-10-11
Payer: MEDICARE

## 2019-10-11 ENCOUNTER — HOSPITAL ENCOUNTER (INPATIENT)
Age: 84
LOS: 1 days | Discharge: HOME | DRG: 440 | End: 2019-10-12
Attending: EMERGENCY MEDICINE | Admitting: INTERNAL MEDICINE
Payer: MEDICARE

## 2019-10-11 ENCOUNTER — APPOINTMENT (OUTPATIENT)
Dept: CT IMAGING | Age: 84
DRG: 440 | End: 2019-10-11
Payer: MEDICARE

## 2019-10-11 DIAGNOSIS — R11.2 NAUSEA AND VOMITING, INTRACTABILITY OF VOMITING NOT SPECIFIED, UNSPECIFIED VOMITING TYPE: ICD-10-CM

## 2019-10-11 DIAGNOSIS — K85.90 ACUTE PANCREATITIS, UNSPECIFIED COMPLICATION STATUS, UNSPECIFIED PANCREATITIS TYPE: Primary | ICD-10-CM

## 2019-10-11 DIAGNOSIS — R07.9 CHEST PAIN, UNSPECIFIED TYPE: ICD-10-CM

## 2019-10-11 PROBLEM — K85.00 IDIOPATHIC ACUTE PANCREATITIS: Status: ACTIVE | Noted: 2019-10-11

## 2019-10-11 LAB
ALBUMIN SERPL-MCNC: 4.5 G/DL (ref 3.4–5)
ALP BLD-CCNC: 69 U/L (ref 40–129)
ALT SERPL-CCNC: 43 U/L (ref 10–40)
ANION GAP SERPL CALCULATED.3IONS-SCNC: 13 MMOL/L (ref 3–16)
AST SERPL-CCNC: 90 U/L (ref 15–37)
BASOPHILS ABSOLUTE: 0 K/UL (ref 0–0.2)
BASOPHILS RELATIVE PERCENT: 0.5 %
BILIRUB SERPL-MCNC: 1.2 MG/DL (ref 0–1)
BILIRUBIN DIRECT: 0.7 MG/DL (ref 0–0.3)
BILIRUBIN, INDIRECT: 0.5 MG/DL (ref 0–1)
BUN BLDV-MCNC: 18 MG/DL (ref 7–20)
CALCIUM SERPL-MCNC: 9.6 MG/DL (ref 8.3–10.6)
CHLORIDE BLD-SCNC: 104 MMOL/L (ref 99–110)
CO2: 22 MMOL/L (ref 21–32)
CREAT SERPL-MCNC: 0.8 MG/DL (ref 0.6–1.2)
EKG ATRIAL RATE: 59 BPM
EKG DIAGNOSIS: NORMAL
EKG P AXIS: 69 DEGREES
EKG P-R INTERVAL: 180 MS
EKG Q-T INTERVAL: 438 MS
EKG QRS DURATION: 84 MS
EKG QTC CALCULATION (BAZETT): 433 MS
EKG R AXIS: 13 DEGREES
EKG T AXIS: 58 DEGREES
EKG VENTRICULAR RATE: 59 BPM
EOSINOPHILS ABSOLUTE: 0.1 K/UL (ref 0–0.6)
EOSINOPHILS RELATIVE PERCENT: 0.7 %
GFR AFRICAN AMERICAN: >60
GFR NON-AFRICAN AMERICAN: >60
GLUCOSE BLD-MCNC: 113 MG/DL (ref 70–99)
HCT VFR BLD CALC: 36.7 % (ref 36–48)
HEMOGLOBIN: 12.1 G/DL (ref 12–16)
LIPASE: 2005 U/L (ref 13–60)
LYMPHOCYTES ABSOLUTE: 0.9 K/UL (ref 1–5.1)
LYMPHOCYTES RELATIVE PERCENT: 10.1 %
MCH RBC QN AUTO: 32.6 PG (ref 26–34)
MCHC RBC AUTO-ENTMCNC: 32.9 G/DL (ref 31–36)
MCV RBC AUTO: 99.3 FL (ref 80–100)
MONOCYTES ABSOLUTE: 0.3 K/UL (ref 0–1.3)
MONOCYTES RELATIVE PERCENT: 3.8 %
NEUTROPHILS ABSOLUTE: 7.2 K/UL (ref 1.7–7.7)
NEUTROPHILS RELATIVE PERCENT: 84.9 %
PDW BLD-RTO: 14.7 % (ref 12.4–15.4)
PLATELET # BLD: 324 K/UL (ref 135–450)
PMV BLD AUTO: 7.9 FL (ref 5–10.5)
POTASSIUM REFLEX MAGNESIUM: 4.3 MMOL/L (ref 3.5–5.1)
RBC # BLD: 3.69 M/UL (ref 4–5.2)
SODIUM BLD-SCNC: 139 MMOL/L (ref 136–145)
TOTAL PROTEIN: 6.9 G/DL (ref 6.4–8.2)
TRIGL SERPL-MCNC: 71 MG/DL (ref 0–150)
TROPONIN: <0.01 NG/ML
WBC # BLD: 8.5 K/UL (ref 4–11)

## 2019-10-11 PROCEDURE — 80048 BASIC METABOLIC PNL TOTAL CA: CPT

## 2019-10-11 PROCEDURE — 93010 ELECTROCARDIOGRAM REPORT: CPT | Performed by: INTERNAL MEDICINE

## 2019-10-11 PROCEDURE — 85025 COMPLETE CBC W/AUTO DIFF WBC: CPT

## 2019-10-11 PROCEDURE — 71046 X-RAY EXAM CHEST 2 VIEWS: CPT

## 2019-10-11 PROCEDURE — 74178 CT ABD&PLV WO CNTR FLWD CNTR: CPT

## 2019-10-11 PROCEDURE — 94760 N-INVAS EAR/PLS OXIMETRY 1: CPT

## 2019-10-11 PROCEDURE — 2060000000 HC ICU INTERMEDIATE R&B

## 2019-10-11 PROCEDURE — 2580000003 HC RX 258: Performed by: PHYSICIAN ASSISTANT

## 2019-10-11 PROCEDURE — 99285 EMERGENCY DEPT VISIT HI MDM: CPT

## 2019-10-11 PROCEDURE — 83690 ASSAY OF LIPASE: CPT

## 2019-10-11 PROCEDURE — 80076 HEPATIC FUNCTION PANEL: CPT

## 2019-10-11 PROCEDURE — 84478 ASSAY OF TRIGLYCERIDES: CPT

## 2019-10-11 PROCEDURE — 6370000000 HC RX 637 (ALT 250 FOR IP): Performed by: INTERNAL MEDICINE

## 2019-10-11 PROCEDURE — 2580000003 HC RX 258: Performed by: EMERGENCY MEDICINE

## 2019-10-11 PROCEDURE — 84484 ASSAY OF TROPONIN QUANT: CPT

## 2019-10-11 PROCEDURE — 76705 ECHO EXAM OF ABDOMEN: CPT

## 2019-10-11 PROCEDURE — 93005 ELECTROCARDIOGRAM TRACING: CPT | Performed by: EMERGENCY MEDICINE

## 2019-10-11 PROCEDURE — 6360000004 HC RX CONTRAST MEDICATION: Performed by: EMERGENCY MEDICINE

## 2019-10-11 RX ORDER — PILOCARPINE HYDROCHLORIDE 20 MG/ML
2 SOLUTION/ DROPS OPHTHALMIC 2 TIMES DAILY
Status: DISCONTINUED | OUTPATIENT
Start: 2019-10-11 | End: 2019-10-11

## 2019-10-11 RX ORDER — PILOCARPINE HYDROCHLORIDE 20 MG/ML
1 SOLUTION/ DROPS OPHTHALMIC 2 TIMES DAILY
Status: CANCELLED | OUTPATIENT
Start: 2019-10-11

## 2019-10-11 RX ORDER — DORZOLAMIDE HYDROCHLORIDE AND TIMOLOL MALEATE 20; 5 MG/ML; MG/ML
1 SOLUTION/ DROPS OPHTHALMIC 2 TIMES DAILY
Status: DISCONTINUED | OUTPATIENT
Start: 2019-10-11 | End: 2019-10-12 | Stop reason: HOSPADM

## 2019-10-11 RX ORDER — SODIUM CHLORIDE 9 MG/ML
INJECTION, SOLUTION INTRAVENOUS CONTINUOUS
Status: DISCONTINUED | OUTPATIENT
Start: 2019-10-11 | End: 2019-10-11

## 2019-10-11 RX ORDER — SODIUM CHLORIDE 0.9 % (FLUSH) 0.9 %
10 SYRINGE (ML) INJECTION PRN
Status: DISCONTINUED | OUTPATIENT
Start: 2019-10-11 | End: 2019-10-12 | Stop reason: HOSPADM

## 2019-10-11 RX ORDER — SODIUM CHLORIDE, SODIUM LACTATE, POTASSIUM CHLORIDE, CALCIUM CHLORIDE 600; 310; 30; 20 MG/100ML; MG/100ML; MG/100ML; MG/100ML
INJECTION, SOLUTION INTRAVENOUS CONTINUOUS
Status: DISCONTINUED | OUTPATIENT
Start: 2019-10-11 | End: 2019-10-12 | Stop reason: HOSPADM

## 2019-10-11 RX ORDER — PILOCARPINE HYDROCHLORIDE 20 MG/ML
2 SOLUTION/ DROPS OPHTHALMIC 2 TIMES DAILY
Status: DISCONTINUED | OUTPATIENT
Start: 2019-10-11 | End: 2019-10-12 | Stop reason: HOSPADM

## 2019-10-11 RX ORDER — MORPHINE SULFATE 4 MG/ML
4 INJECTION, SOLUTION INTRAMUSCULAR; INTRAVENOUS
Status: DISCONTINUED | OUTPATIENT
Start: 2019-10-11 | End: 2019-10-11

## 2019-10-11 RX ORDER — LATANOPROST 50 UG/ML
1 SOLUTION/ DROPS OPHTHALMIC NIGHTLY
Status: CANCELLED | OUTPATIENT
Start: 2019-10-11

## 2019-10-11 RX ORDER — ONDANSETRON 2 MG/ML
4 INJECTION INTRAMUSCULAR; INTRAVENOUS
Status: DISCONTINUED | OUTPATIENT
Start: 2019-10-11 | End: 2019-10-11

## 2019-10-11 RX ORDER — ACETAMINOPHEN 325 MG/1
650 TABLET ORAL EVERY 4 HOURS PRN
Status: DISCONTINUED | OUTPATIENT
Start: 2019-10-11 | End: 2019-10-12 | Stop reason: HOSPADM

## 2019-10-11 RX ORDER — DORZOLAMIDE HYDROCHLORIDE AND TIMOLOL MALEATE 20; 5 MG/ML; MG/ML
1 SOLUTION/ DROPS OPHTHALMIC 2 TIMES DAILY
Status: CANCELLED | OUTPATIENT
Start: 2019-10-11

## 2019-10-11 RX ORDER — LATANOPROST 50 UG/ML
1 SOLUTION/ DROPS OPHTHALMIC NIGHTLY
Status: DISCONTINUED | OUTPATIENT
Start: 2019-10-11 | End: 2019-10-12 | Stop reason: HOSPADM

## 2019-10-11 RX ORDER — ASPIRIN 81 MG/1
81 TABLET, CHEWABLE ORAL DAILY
Status: DISCONTINUED | OUTPATIENT
Start: 2019-10-11 | End: 2019-10-12 | Stop reason: HOSPADM

## 2019-10-11 RX ORDER — M-VIT,TX,IRON,MINS/CALC/FOLIC 27MG-0.4MG
1 TABLET ORAL DAILY
Status: DISCONTINUED | OUTPATIENT
Start: 2019-10-12 | End: 2019-10-12 | Stop reason: HOSPADM

## 2019-10-11 RX ORDER — ONDANSETRON 2 MG/ML
4 INJECTION INTRAMUSCULAR; INTRAVENOUS EVERY 6 HOURS PRN
Status: DISCONTINUED | OUTPATIENT
Start: 2019-10-11 | End: 2019-10-12 | Stop reason: HOSPADM

## 2019-10-11 RX ORDER — LANOLIN ALCOHOL/MO/W.PET/CERES
1000 CREAM (GRAM) TOPICAL DAILY
Status: DISCONTINUED | OUTPATIENT
Start: 2019-10-12 | End: 2019-10-12 | Stop reason: HOSPADM

## 2019-10-11 RX ORDER — SODIUM CHLORIDE 0.9 % (FLUSH) 0.9 %
10 SYRINGE (ML) INJECTION EVERY 12 HOURS SCHEDULED
Status: DISCONTINUED | OUTPATIENT
Start: 2019-10-11 | End: 2019-10-12 | Stop reason: HOSPADM

## 2019-10-11 RX ADMIN — SODIUM CHLORIDE, POTASSIUM CHLORIDE, SODIUM LACTATE AND CALCIUM CHLORIDE: 600; 310; 30; 20 INJECTION, SOLUTION INTRAVENOUS at 23:18

## 2019-10-11 RX ADMIN — SODIUM CHLORIDE, POTASSIUM CHLORIDE, SODIUM LACTATE AND CALCIUM CHLORIDE: 600; 310; 30; 20 INJECTION, SOLUTION INTRAVENOUS at 17:26

## 2019-10-11 RX ADMIN — PILOCARPINE HYDROCHLORIDE 2 DROP: 20 SOLUTION/ DROPS OPHTHALMIC at 20:48

## 2019-10-11 RX ADMIN — LATANOPROST 1 DROP: 50 SOLUTION/ DROPS OPHTHALMIC at 20:48

## 2019-10-11 RX ADMIN — IOPAMIDOL 75 ML: 755 INJECTION, SOLUTION INTRAVENOUS at 13:01

## 2019-10-11 RX ADMIN — SODIUM CHLORIDE: 9 INJECTION, SOLUTION INTRAVENOUS at 14:22

## 2019-10-11 RX ADMIN — ASPIRIN 81 MG 81 MG: 81 TABLET ORAL at 17:25

## 2019-10-11 RX ADMIN — DORZOLAMIDE HYDROCHLORIDE AND TIMOLOL MALEATE 1 DROP: 20; 5 SOLUTION/ DROPS OPHTHALMIC at 20:48

## 2019-10-11 ASSESSMENT — PAIN DESCRIPTION - ORIENTATION
ORIENTATION: MID
ORIENTATION: MID

## 2019-10-11 ASSESSMENT — PAIN SCALES - GENERAL
PAINLEVEL_OUTOF10: 0
PAINLEVEL_OUTOF10: 6
PAINLEVEL_OUTOF10: 0
PAINLEVEL_OUTOF10: 4

## 2019-10-11 ASSESSMENT — PAIN DESCRIPTION - FREQUENCY
FREQUENCY: CONTINUOUS
FREQUENCY: CONTINUOUS

## 2019-10-11 ASSESSMENT — PAIN DESCRIPTION - PAIN TYPE
TYPE: ACUTE PAIN
TYPE: ACUTE PAIN

## 2019-10-11 ASSESSMENT — PAIN DESCRIPTION - PROGRESSION
CLINICAL_PROGRESSION: NOT CHANGED
CLINICAL_PROGRESSION: GRADUALLY IMPROVING

## 2019-10-11 ASSESSMENT — PAIN - FUNCTIONAL ASSESSMENT
PAIN_FUNCTIONAL_ASSESSMENT: PREVENTS OR INTERFERES SOME ACTIVE ACTIVITIES AND ADLS
PAIN_FUNCTIONAL_ASSESSMENT: ACTIVITIES ARE NOT PREVENTED

## 2019-10-11 ASSESSMENT — PAIN DESCRIPTION - LOCATION
LOCATION: CHEST
LOCATION: CHEST

## 2019-10-11 ASSESSMENT — PAIN DESCRIPTION - DESCRIPTORS: DESCRIPTORS: PRESSURE

## 2019-10-11 ASSESSMENT — PAIN DESCRIPTION - ONSET
ONSET: ON-GOING
ONSET: ON-GOING

## 2019-10-12 VITALS
BODY MASS INDEX: 19.73 KG/M2 | DIASTOLIC BLOOD PRESSURE: 61 MMHG | HEART RATE: 71 BPM | SYSTOLIC BLOOD PRESSURE: 140 MMHG | TEMPERATURE: 98 F | RESPIRATION RATE: 16 BRPM | HEIGHT: 66 IN | OXYGEN SATURATION: 94 % | WEIGHT: 122.8 LBS

## 2019-10-12 LAB
ALBUMIN SERPL-MCNC: 3.7 G/DL (ref 3.4–5)
ALP BLD-CCNC: 67 U/L (ref 40–129)
ALT SERPL-CCNC: 58 U/L (ref 10–40)
ANION GAP SERPL CALCULATED.3IONS-SCNC: 11 MMOL/L (ref 3–16)
AST SERPL-CCNC: 70 U/L (ref 15–37)
BASOPHILS ABSOLUTE: 0.1 K/UL (ref 0–0.2)
BASOPHILS RELATIVE PERCENT: 1 %
BILIRUB SERPL-MCNC: 0.7 MG/DL (ref 0–1)
BILIRUBIN DIRECT: <0.2 MG/DL (ref 0–0.3)
BILIRUBIN, INDIRECT: ABNORMAL MG/DL (ref 0–1)
BUN BLDV-MCNC: 13 MG/DL (ref 7–20)
CALCIUM SERPL-MCNC: 9.1 MG/DL (ref 8.3–10.6)
CHLORIDE BLD-SCNC: 110 MMOL/L (ref 99–110)
CO2: 24 MMOL/L (ref 21–32)
CREAT SERPL-MCNC: 0.8 MG/DL (ref 0.6–1.2)
EOSINOPHILS ABSOLUTE: 0.3 K/UL (ref 0–0.6)
EOSINOPHILS RELATIVE PERCENT: 5.3 %
GFR AFRICAN AMERICAN: >60
GFR NON-AFRICAN AMERICAN: >60
GLUCOSE BLD-MCNC: 88 MG/DL (ref 70–99)
HCT VFR BLD CALC: 31.8 % (ref 36–48)
HEMOGLOBIN: 10.5 G/DL (ref 12–16)
LIPASE: 269 U/L (ref 13–60)
LYMPHOCYTES ABSOLUTE: 1.4 K/UL (ref 1–5.1)
LYMPHOCYTES RELATIVE PERCENT: 25.8 %
MCH RBC QN AUTO: 32.3 PG (ref 26–34)
MCHC RBC AUTO-ENTMCNC: 33.1 G/DL (ref 31–36)
MCV RBC AUTO: 97.4 FL (ref 80–100)
MONOCYTES ABSOLUTE: 0.5 K/UL (ref 0–1.3)
MONOCYTES RELATIVE PERCENT: 9.9 %
NEUTROPHILS ABSOLUTE: 3.2 K/UL (ref 1.7–7.7)
NEUTROPHILS RELATIVE PERCENT: 58 %
PDW BLD-RTO: 14.4 % (ref 12.4–15.4)
PHOSPHORUS: 2.5 MG/DL (ref 2.5–4.9)
PLATELET # BLD: 293 K/UL (ref 135–450)
PMV BLD AUTO: 8 FL (ref 5–10.5)
POTASSIUM SERPL-SCNC: 3.6 MMOL/L (ref 3.5–5.1)
RBC # BLD: 3.26 M/UL (ref 4–5.2)
SODIUM BLD-SCNC: 145 MMOL/L (ref 136–145)
TOTAL PROTEIN: 5.6 G/DL (ref 6.4–8.2)
WBC # BLD: 5.5 K/UL (ref 4–11)

## 2019-10-12 PROCEDURE — 36415 COLL VENOUS BLD VENIPUNCTURE: CPT

## 2019-10-12 PROCEDURE — 6360000002 HC RX W HCPCS: Performed by: INTERNAL MEDICINE

## 2019-10-12 PROCEDURE — APPNB180 APP NON BILLABLE TIME > 60 MINS: Performed by: PHYSICIAN ASSISTANT

## 2019-10-12 PROCEDURE — 2580000003 HC RX 258: Performed by: PHYSICIAN ASSISTANT

## 2019-10-12 PROCEDURE — 2580000003 HC RX 258: Performed by: INTERNAL MEDICINE

## 2019-10-12 PROCEDURE — 85025 COMPLETE CBC W/AUTO DIFF WBC: CPT

## 2019-10-12 PROCEDURE — 80069 RENAL FUNCTION PANEL: CPT

## 2019-10-12 PROCEDURE — APPSS180 APP SPLIT SHARED TIME > 60 MINUTES: Performed by: PHYSICIAN ASSISTANT

## 2019-10-12 PROCEDURE — 6370000000 HC RX 637 (ALT 250 FOR IP): Performed by: INTERNAL MEDICINE

## 2019-10-12 PROCEDURE — 99223 1ST HOSP IP/OBS HIGH 75: CPT | Performed by: SURGERY

## 2019-10-12 PROCEDURE — 80076 HEPATIC FUNCTION PANEL: CPT

## 2019-10-12 PROCEDURE — 83690 ASSAY OF LIPASE: CPT

## 2019-10-12 PROCEDURE — 94760 N-INVAS EAR/PLS OXIMETRY 1: CPT

## 2019-10-12 RX ADMIN — ENOXAPARIN SODIUM 40 MG: 40 INJECTION SUBCUTANEOUS at 08:39

## 2019-10-12 RX ADMIN — PILOCARPINE HYDROCHLORIDE 2 DROP: 20 SOLUTION/ DROPS OPHTHALMIC at 08:39

## 2019-10-12 RX ADMIN — SODIUM CHLORIDE, POTASSIUM CHLORIDE, SODIUM LACTATE AND CALCIUM CHLORIDE: 600; 310; 30; 20 INJECTION, SOLUTION INTRAVENOUS at 03:08

## 2019-10-12 RX ADMIN — DORZOLAMIDE HYDROCHLORIDE AND TIMOLOL MALEATE 1 DROP: 20; 5 SOLUTION/ DROPS OPHTHALMIC at 08:39

## 2019-10-12 RX ADMIN — ASPIRIN 81 MG 81 MG: 81 TABLET ORAL at 08:39

## 2019-10-12 RX ADMIN — SODIUM CHLORIDE, PRESERVATIVE FREE 10 ML: 5 INJECTION INTRAVENOUS at 08:40

## 2022-01-19 ENCOUNTER — OFFICE VISIT (OUTPATIENT)
Dept: UROGYNECOLOGY | Age: 87
End: 2022-01-19
Payer: MEDICARE

## 2022-01-19 VITALS — HEART RATE: 75 BPM | RESPIRATION RATE: 18 BRPM | TEMPERATURE: 97.6 F | OXYGEN SATURATION: 99 %

## 2022-01-19 DIAGNOSIS — R39.15 URINARY URGENCY: Primary | ICD-10-CM

## 2022-01-19 DIAGNOSIS — N39.41 URGE INCONTINENCE: ICD-10-CM

## 2022-01-19 DIAGNOSIS — L90.0 LICHEN SCLEROSUS ET ATROPHICUS: ICD-10-CM

## 2022-01-19 DIAGNOSIS — R35.0 URINARY FREQUENCY: ICD-10-CM

## 2022-01-19 PROCEDURE — G8421 BMI NOT CALCULATED: HCPCS | Performed by: OBSTETRICS & GYNECOLOGY

## 2022-01-19 PROCEDURE — 0509F URINE INCON PLAN DOCD: CPT | Performed by: OBSTETRICS & GYNECOLOGY

## 2022-01-19 PROCEDURE — 99204 OFFICE O/P NEW MOD 45 MIN: CPT | Performed by: OBSTETRICS & GYNECOLOGY

## 2022-01-19 PROCEDURE — G8484 FLU IMMUNIZE NO ADMIN: HCPCS | Performed by: OBSTETRICS & GYNECOLOGY

## 2022-01-19 PROCEDURE — 1090F PRES/ABSN URINE INCON ASSESS: CPT | Performed by: OBSTETRICS & GYNECOLOGY

## 2022-01-19 PROCEDURE — G8427 DOCREV CUR MEDS BY ELIG CLIN: HCPCS | Performed by: OBSTETRICS & GYNECOLOGY

## 2022-01-19 RX ORDER — AMOXICILLIN 250 MG
1 CAPSULE ORAL DAILY PRN
COMMUNITY

## 2022-01-19 RX ORDER — SODIUM CHLORIDE 0.9 % (FLUSH) 0.9 %
5-40 SYRINGE (ML) INJECTION EVERY 12 HOURS SCHEDULED
Status: CANCELLED | OUTPATIENT
Start: 2022-01-31

## 2022-01-19 RX ORDER — SODIUM CHLORIDE 0.9 % (FLUSH) 0.9 %
5-40 SYRINGE (ML) INJECTION PRN
Status: CANCELLED | OUTPATIENT
Start: 2022-01-31

## 2022-01-19 RX ORDER — SODIUM CHLORIDE 9 MG/ML
25 INJECTION, SOLUTION INTRAVENOUS PRN
Status: CANCELLED | OUTPATIENT
Start: 2022-01-31

## 2022-01-19 RX ORDER — NETARSUDIL AND LATANOPROST OPHTHALMIC SOLUTION, 0.02%/0.005% .2; .05 MG/ML; MG/ML
1 SOLUTION/ DROPS OPHTHALMIC; TOPICAL NIGHTLY
COMMUNITY
Start: 2021-12-20

## 2022-01-19 RX ORDER — ESTRADIOL 0.1 MG/G
CREAM VAGINAL
COMMUNITY
Start: 2021-05-13 | End: 2022-01-24 | Stop reason: ALTCHOICE

## 2022-01-19 NOTE — PROGRESS NOTES
1/19/2022      HPI:     Name: Sylvester Goode  YOB: 1932    CC: Patient is a 80 y.o. female who is seen in consultation from Dr Nadia Guillen   for evaluation of bladder leakage. HPI: She presents today complaining of significant urinary incontinence including urinary urgency frequency urge incontinence. She goes through approximately 5 pads a day. Bladder control problem: Yes   How many months have you had a bladder problem? 2 years  Do you use pads to absorb lost urine? Yes. If yes how many pads do you wear a day? 3   How many trips do you make to the bathroom during the day? 4-5  How many times do you wake at night to go to the bathroom? 1 or2  Do you ever wet the bed while asleep? No  Are there times when you cannot make it to the bathroom on time? Yes  Does sound, sight, or feel of running water cause you to lose urine? No  How many glasses of liquid do you consume daily? 50oz  How many drinks containing caffeine do you consume daily? 1 (8oz)  Which best describes urine loss: (Check all that apply)   [x] I lose urine during coughing, sneezing, running, lifting   [x] I lose urine with changes in posture, standing, walking   [x] I lose urine continuously such that I am constantly wet   [x] I have sudden, urgent needs without the ability to make it to the bathroom  Have you seen a physician for complaints of urine loss? No   Have you taken medication to prevent urine loss? No       Bladder emptying problems:  Yes   How long have you had bladder emptying problems? Worsening last couple months  Do you notice any dribbling of urine when you stand after passing urine? Yes  Do you usually have difficulty starting your urine stream? No  Do you have to assume abnormal positions to urinate? No   Do you have to strain to empty your bladder? No  Do you feel as if your bladder is empty after passing urine?  Yes  Is your urine flow: intermittent  Prolapse/Vaginal Support problems: No   Bowel problem(s): No  Sexual History:  reports previously being sexually active. Pelvic Pain:  No   Ob/Gyn History:    OB History    Para Term  AB Living   6 4 4   2 4   SAB IAB Ectopic Molar Multiple Live Births   2         4      # Outcome Date GA Lbr Nick/2nd Weight Sex Delivery Anes PTL Lv   6 Term      Vag-Spont   DALE   5 Term      Vag-Spont   DALE   4 Term      Vag-Spont   DALE   3 SAB         FD   2 SAB         FD   1 Term      Vag-Spont   DALE     Past Medical History:   Past Medical History:   Diagnosis Date    Glaucoma     Thyroid disease      Past Surgical History:   Past Surgical History:   Procedure Laterality Date    ANTERIOR CRUCIATE LIGAMENT REPAIR Right     APPENDECTOMY      CHOLECYSTECTOMY      COLON SURGERY  1989    EYE SURGERY      cataract     Allergies:    Allergies   Allergen Reactions    Codeine Nausea Only and Nausea And Vomiting     \"feels like I'm going to pass out\"    Morphine And Related Nausea And Vomiting and Other (See Comments)     \"passing out\"       Current Medications:  Current Outpatient Medications   Medication Sig Dispense Refill    senna-docusate (PERICOLACE) 8.6-50 MG per tablet Take 1 tablet by mouth daily as needed      psyllium (METAMUCIL) 58.6 % packet Take 1 packet by mouth daily      ROCKLATAN 0.02-0.005 % SOLN       Fish Oil-Krill Oil (MEGARED ADVANCED 4 IN 1 PO) Take by mouth      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      aspirin 81 MG chewable tablet Take 81 mg by mouth nightly       Ascorbic Acid (VITAMIN C) 1000 MG tablet Take 1,000 mg by mouth daily       pyridoxine (B-6) 100 MG tablet Take 100 mg by mouth daily      cyanocobalamin 1000 MCG tablet Take 1,000 mcg by mouth daily      vitamin D (CHOLECALCIFEROL) 1000 units TABS tablet Take 1,000 Units by mouth daily      dorzolamide-timolol (COSOPT) 22.3-6.8 MG/ML ophthalmic solution Place 1 drop into both eyes 2 times daily       pilocarpine (PILOCAR) 2 % ophthalmic solution Place 1 drop into the right eye 2 times daily       Multiple Vitamins-Minerals (THERAPEUTIC MULTIVITAMIN-MINERALS) tablet Take 1 tablet by mouth daily      CALCIUM-VITAMIN D PO Take 600 mg by mouth daily       BIOTIN PO Take by mouth      estradiol (ESTRACE) 0.1 MG/GM vaginal cream Start using 2grams topically nightly for 1 week, then decrease to 2grams twice weekly (Patient not taking: Reported on 1/19/2022)      latanoprost (XALATAN) 0.005 % ophthalmic solution Place 1 drop into the right eye nightly       OMEGA-3 FATTY ACIDS PO Take 1 g by mouth daily       Multiple Vitamins-Minerals (VITEYES AREDS FORMULA) CAPS Take 1 capsule by mouth daily  (Patient not taking: Reported on 1/19/2022)       No current facility-administered medications for this visit. Social History:   Social History     Socioeconomic History    Marital status:      Spouse name: Not on file    Number of children: Not on file    Years of education: Not on file    Highest education level: Not on file   Occupational History    Not on file   Tobacco Use    Smoking status: Never Smoker    Smokeless tobacco: Never Used   Substance and Sexual Activity    Alcohol use: Yes     Comment: occassionally    Drug use: Never    Sexual activity: Not Currently   Other Topics Concern    Not on file   Social History Narrative    Not on file     Social Determinants of Health     Financial Resource Strain:     Difficulty of Paying Living Expenses: Not on file   Food Insecurity:     Worried About Running Out of Food in the Last Year: Not on file    Sami of Food in the Last Year: Not on file   Transportation Needs:     Lack of Transportation (Medical): Not on file    Lack of Transportation (Non-Medical):  Not on file   Physical Activity:     Days of Exercise per Week: Not on file    Minutes of Exercise per Session: Not on file   Stress:     Feeling of Stress : Not on file   Social Connections:     Frequency of Communication with Friends and Family: Not on file  Frequency of Social Gatherings with Friends and Family: Not on file    Attends Rastafarian Services: Not on file    Active Member of Clubs or Organizations: Not on file    Attends Club or Organization Meetings: Not on file    Marital Status: Not on file   Intimate Partner Violence:     Fear of Current or Ex-Partner: Not on file    Emotionally Abused: Not on file    Physically Abused: Not on file    Sexually Abused: Not on file   Housing Stability:     Unable to Pay for Housing in the Last Year: Not on file    Number of [place] in the Last Year: Not on file    Unstable Housing in the Last Year: Not on file     Family History:   Family History   Problem Relation Age of Onset    Lung Cancer Brother     Heart Attack Brother     Diabetes Brother     Stroke Brother      Review of System  Review of Systems   Genitourinary: Positive for enuresis. All other systems reviewed and are negative. A review of systems was done by the patient and reviewed by the provider. Objective:     Vital Signs  Vitals:    01/19/22 1412   Pulse: 75   Resp: 18   Temp: 97.6 °F (36.4 °C)   SpO2: 99%     Physical Exam  Physical Exam  HENT:      Head: Normocephalic and atraumatic. Eyes:      Conjunctiva/sclera: Conjunctivae normal.   Pulmonary:      Effort: Pulmonary effort is normal.   Abdominal:      Palpations: Abdomen is soft. Genitourinary:     Comments: Whitish changes throughout the clitoral jon vulva and perianal area. 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 Musculoskeletal:      Cervical back: Normal range of motion and neck supple. Skin:     General: Skin is warm and dry. Neurological:      Mental Status: She is alert and oriented to person, place, and time. Office Fill Study/Urine Dip:     Using sterile technique a manometry catheter was placed. Patient's bladder was filled with sterile water by gravity. Capacity and storage volumes were measured. Spasms assessed. Catheter was removed. Stress urinary incontinence was assessed. No results found for this visit on 01/19/22. Cystometrogram   RBC, UA   Date Value Ref Range Status   06/08/2019 1 0 - 4 /HPF Final     Nitrite, Urine   Date Value Ref Range Status   06/08/2019 Negative Negative Final        POPQ and Pelvic Exam:     Pelvic Organ Prolapse Quantification  No data recorded No data recorded No data recorded   No data recorded No data recorded No data recorded   No data recorded No data recorded No data recorded   No data recorded     Assessment/Plan:     Joe Amin is a 80 y.o. female with   1. Urinary urgency    2. Urinary frequency    3. Urge incontinence    4. 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 The vaginal area opening is only about 1 Q-tip in diameter. I am uncertain if this is lichen sclerosis or if this is a fungal infection. Because of it being so extensive I did talk with her and her daughter today about going to the operating room with sedation in order to open the vagina and take a biopsy. We can also do a cystourethroscopy at that time. The patient was counseled on surgical and non-surgical options. The patient elected to move forward with surgery. The risks and benefits of surgery including but not limited to bleeding, infection, injury to bowel, bladder, ureter, or other internal organs, transfusion, pain, bowel dysfunction, urinary incontinence, and sexual dysfunction were discussed at length. All questions were answered to the patients satisfaction. The patient elected to undergo cystourethroscopy and vaginal separation. preop orders were placed. Orders Placed This Encounter   Procedures    Initiate PAT Protocol     Standing Status:   Future     Standing Expiration Date:   3/20/2022     No orders of the defined types were placed in this encounter.       YUN BARAHONA Jose Manuel Gallegos MD

## 2022-01-20 DIAGNOSIS — Z20.822 ENCOUNTER FOR PREOPERATIVE SCREENING LABORATORY TESTING FOR COVID-19 VIRUS: Primary | ICD-10-CM

## 2022-01-20 DIAGNOSIS — Z01.812 ENCOUNTER FOR PREOPERATIVE SCREENING LABORATORY TESTING FOR COVID-19 VIRUS: Primary | ICD-10-CM

## 2022-01-24 RX ORDER — BIOTIN 1 MG
1 TABLET ORAL DAILY
COMMUNITY

## 2022-01-24 RX ORDER — KRILL/OM-3/DHA/EPA/PHOSPHO/AST 500-110 MG
1 CAPSULE ORAL DAILY
COMMUNITY

## 2022-01-24 NOTE — PROGRESS NOTES
DOS 1/31/2022  SURGEON-  [facility]  PATIENT SEEING DR. Nuris Jesus. Juan Antonio Niranjan 89 ON 1/27/2022 AT 10AM FOR HER PRE-OP H&P-HER PHONE # [phone]

## 2022-01-24 NOTE — PROGRESS NOTES
Preoperative Screening for Elective Surgery/Invasive Procedures While COVID-19 present in the community     1. Have you tested positive or have been told to self-isolate for COVID-19 like symptoms within the past 28 days?no  2. Do you currently have any of the following symptoms?no  ? Fever >100.0 F or 99.9 F in immunocompromised patients? NO  ? New onset cough, shortness of breath or difficulty breathing? NO  ? New onset sore throat, myalgia (muscle aches and pains), headache, loss of taste/smell or diarrhea? NO  3. Have you had a potential exposure to COVID-19 within the past 14 days by:no  ? Close contact with a confirmed case? NO  ? Close contact with a healthcare worker,  or essential infrastructure worker (grocery store, [employer], gas station, public utilities or transportation)?no  ? Do you reside in a congregate setting such as; skilled nursing facility, adult home, correctional facility, homeless shelter or other institutional setting? NO  ? Have you had recent travel to a known COVID-19 hotspot? NO     Indicate if the patient has a positive screen by answering yes to one or more of the above questions. C-Difficile admission screening and protocol:       * Admitted with diarrhea? [] YES    [x]  NO     *Prior history of C-Diff. In last 3 months? [] YES    [x]  NO     *Antibiotic use in the past 6-8 weeks? [x]  NO    []  YES                 If yes, which ANTIBIOTIC AND REASON______     *Prior hospitalization or nursing home in the last month? []  YES    [x]  NO        [facility] PRE-OPERATIVE INSTRUCTIONS    Arrival time_1050___________        Surgery time__1250__________    Take the following medications with a sip of water: Follow your MD/Surgeons pre-procedure instructions regarding your medications    Do not eat or drink anything after 12:00 midnight prior to your surgery. This includes water chewing gum, mints and ice chips.    You may brush your teeth and gargle the morning of your surgery, but do not swallow the water     Please see your family doctor/pediatrician for a history and physical and/or concerning medications. Bring any test results/reports from your physicians office. If you are under the care of a heart doctor or specialist doctor, please be aware that you may be asked to them for clearance    You may be asked to stop blood thinners such as Coumadin, Plavix, Fragmin, Lovenox, etc., or any anti-inflammatories such as:  Aspirin, Ibuprofen, Advil, Naproxen prior to your surgery. We also ask that you stop any OTC medications such as fish oil, vitamin E, glucosamine, garlic, Multivitamins, COQ 10, etc. MAY TAKE TYLENOL    We ask that you do not smoke 24 hours prior to surgery  We ask that you do not  drink any alcoholic beverages 24 hours prior to surgery     You must make arrangements for a responsible adult to take you home after your surgery. For your safety you will not be allowed to leave alone or drive yourself home. Your surgery will be cancelled if you do not have a ride home. Also for your safety, it is strongly suggested that someone stay with you the first 24 hours after your surgery. A parent or legal guardian must accompany a child scheduled for surgery and plan to stay at the hospital until the child is discharged. Please do not bring other children with you. For your comfort, please wear simple loose fitting clothing to the hospital.  Please do not bring valuables. Do not wear any make-up or nail polish on your fingers or toes      For your safety, please do not wear any jewelry or body piercing's on the day of surgery. All jewelry must be removed. If you have dentures, they will be removed before going to operating room. For your convenience, we will provide you with a container. If you wear contact lenses or glasses, they will be removed, please bring a case for them.      If you have a living will and a durable power of  for healthcare, please bring in a copy. As part of our patient safety program to minimize surgical site infections, we ask you to do the following:    · Please notify your surgeon if you develop any illness between         now and the  day of your surgery. · This includes a cough, cold, fever, sore throat, nausea,         or vomiting, and diarrhea, etc.  ·  Please notify your surgeon if you experience dizziness, shortness         of breath or blurred vision between now and the time of your surgery. Do not shave your operative site 96 hours prior to surgery. For face and neck surgery, men may use an electric razor 48 hours   prior to surgery. You may shower the night before surgery or the morning of   your surgery with an antibacterial soap. You will need to bring a photo ID and insurance card    [facility] has an onsite pharmacy, would you like to utilize our pharmacy     If you will be staying overnight and use a C-pap machine, please bring   your C-pap to hospital     Our goal is to provide you with excellent care, therefore, visitors will be limited to two(2) in the room at a time so that we may focus on providing this care for you. Please contact pre-admission testing if you have any further questions. [facility] phone number:  [phone] PAT fax number:  [phone]  Please note these are generalized instructions for all surgical cases, you may be provided with more specific instructions according to your surgery. SAFETY FIRST. .call before you fall    Unfortunately due the rise in covid cases, staffing crisis and hospital capacity, your procedure may need to be rescheduled--if this were to happen your Surgeons office will notify you ASAP

## 2022-01-28 ENCOUNTER — ANESTHESIA EVENT (OUTPATIENT)
Dept: OPERATING ROOM | Age: 87
End: 2022-01-28
Payer: MEDICARE

## 2022-01-31 ENCOUNTER — ANESTHESIA (OUTPATIENT)
Dept: OPERATING ROOM | Age: 87
End: 2022-01-31
Payer: MEDICARE

## 2022-01-31 ENCOUNTER — HOSPITAL ENCOUNTER (OUTPATIENT)
Age: 87
Setting detail: OUTPATIENT SURGERY
Discharge: HOME | End: 2022-01-31
Attending: OBSTETRICS & GYNECOLOGY | Admitting: OBSTETRICS & GYNECOLOGY
Payer: MEDICARE

## 2022-01-31 VITALS
HEART RATE: 69 BPM | HEIGHT: 66 IN | TEMPERATURE: 98 F | BODY MASS INDEX: 20.83 KG/M2 | DIASTOLIC BLOOD PRESSURE: 57 MMHG | WEIGHT: 129.63 LBS | SYSTOLIC BLOOD PRESSURE: 111 MMHG | RESPIRATION RATE: 16 BRPM | OXYGEN SATURATION: 99 %

## 2022-01-31 VITALS — DIASTOLIC BLOOD PRESSURE: 59 MMHG | OXYGEN SATURATION: 100 % | SYSTOLIC BLOOD PRESSURE: 121 MMHG

## 2022-01-31 DIAGNOSIS — Q52.5 FUSION OF LABIA: ICD-10-CM

## 2022-01-31 PROCEDURE — 2580000003 HC RX 258: Performed by: ANESTHESIOLOGY

## 2022-01-31 PROCEDURE — 88305 TISSUE EXAM BY PATHOLOGIST: CPT

## 2022-01-31 PROCEDURE — 56605 BIOPSY OF VULVA/PERINEUM: CPT | Performed by: OBSTETRICS & GYNECOLOGY

## 2022-01-31 PROCEDURE — 7100000000 HC PACU RECOVERY - FIRST 15 MIN: Performed by: OBSTETRICS & GYNECOLOGY

## 2022-01-31 PROCEDURE — 2500000003 HC RX 250 WO HCPCS: Performed by: OBSTETRICS & GYNECOLOGY

## 2022-01-31 PROCEDURE — 7100000001 HC PACU RECOVERY - ADDTL 15 MIN: Performed by: OBSTETRICS & GYNECOLOGY

## 2022-01-31 PROCEDURE — 2709999900 HC NON-CHARGEABLE SUPPLY: Performed by: OBSTETRICS & GYNECOLOGY

## 2022-01-31 PROCEDURE — 3700000001 HC ADD 15 MINUTES (ANESTHESIA): Performed by: OBSTETRICS & GYNECOLOGY

## 2022-01-31 PROCEDURE — 2580000003 HC RX 258: Performed by: OBSTETRICS & GYNECOLOGY

## 2022-01-31 PROCEDURE — A4217 STERILE WATER/SALINE, 500 ML: HCPCS | Performed by: OBSTETRICS & GYNECOLOGY

## 2022-01-31 PROCEDURE — 52000 CYSTOURETHROSCOPY: CPT | Performed by: OBSTETRICS & GYNECOLOGY

## 2022-01-31 PROCEDURE — 7100000010 HC PHASE II RECOVERY - FIRST 15 MIN: Performed by: OBSTETRICS & GYNECOLOGY

## 2022-01-31 PROCEDURE — 2500000003 HC RX 250 WO HCPCS: Performed by: NURSE ANESTHETIST, CERTIFIED REGISTERED

## 2022-01-31 PROCEDURE — 3600000014 HC SURGERY LEVEL 4 ADDTL 15MIN: Performed by: OBSTETRICS & GYNECOLOGY

## 2022-01-31 PROCEDURE — 7100000011 HC PHASE II RECOVERY - ADDTL 15 MIN: Performed by: OBSTETRICS & GYNECOLOGY

## 2022-01-31 PROCEDURE — 3700000000 HC ANESTHESIA ATTENDED CARE: Performed by: OBSTETRICS & GYNECOLOGY

## 2022-01-31 PROCEDURE — 3600000004 HC SURGERY LEVEL 4 BASE: Performed by: OBSTETRICS & GYNECOLOGY

## 2022-01-31 PROCEDURE — 6360000002 HC RX W HCPCS: Performed by: NURSE ANESTHETIST, CERTIFIED REGISTERED

## 2022-01-31 RX ORDER — SODIUM CHLORIDE 0.9 % (FLUSH) 0.9 %
5-40 SYRINGE (ML) INJECTION PRN
Status: DISCONTINUED | OUTPATIENT
Start: 2022-01-31 | End: 2022-01-31 | Stop reason: HOSPADM

## 2022-01-31 RX ORDER — SODIUM CHLORIDE 9 MG/ML
INJECTION, SOLUTION INTRAVENOUS CONTINUOUS
Status: DISCONTINUED | OUTPATIENT
Start: 2022-01-31 | End: 2022-01-31 | Stop reason: HOSPADM

## 2022-01-31 RX ORDER — LIDOCAINE HYDROCHLORIDE AND EPINEPHRINE 10; 10 MG/ML; UG/ML
INJECTION, SOLUTION INFILTRATION; PERINEURAL
Status: COMPLETED | OUTPATIENT
Start: 2022-01-31 | End: 2022-01-31

## 2022-01-31 RX ORDER — LIDOCAINE HYDROCHLORIDE 20 MG/ML
INJECTION, SOLUTION INFILTRATION; PERINEURAL PRN
Status: DISCONTINUED | OUTPATIENT
Start: 2022-01-31 | End: 2022-01-31 | Stop reason: SDUPTHER

## 2022-01-31 RX ORDER — ONDANSETRON 2 MG/ML
4 INJECTION INTRAMUSCULAR; INTRAVENOUS
Status: DISCONTINUED | OUTPATIENT
Start: 2022-01-31 | End: 2022-01-31 | Stop reason: HOSPADM

## 2022-01-31 RX ORDER — SODIUM CHLORIDE 9 MG/ML
25 INJECTION, SOLUTION INTRAVENOUS PRN
Status: DISCONTINUED | OUTPATIENT
Start: 2022-01-31 | End: 2022-01-31 | Stop reason: HOSPADM

## 2022-01-31 RX ORDER — PROPOFOL 10 MG/ML
INJECTION, EMULSION INTRAVENOUS PRN
Status: DISCONTINUED | OUTPATIENT
Start: 2022-01-31 | End: 2022-01-31 | Stop reason: SDUPTHER

## 2022-01-31 RX ORDER — DIPHENHYDRAMINE HYDROCHLORIDE 50 MG/ML
12.5 INJECTION INTRAMUSCULAR; INTRAVENOUS
Status: DISCONTINUED | OUTPATIENT
Start: 2022-01-31 | End: 2022-01-31 | Stop reason: HOSPADM

## 2022-01-31 RX ORDER — SODIUM CHLORIDE 0.9 % (FLUSH) 0.9 %
5-40 SYRINGE (ML) INJECTION EVERY 12 HOURS SCHEDULED
Status: DISCONTINUED | OUTPATIENT
Start: 2022-01-31 | End: 2022-01-31 | Stop reason: HOSPADM

## 2022-01-31 RX ORDER — LABETALOL HYDROCHLORIDE 5 MG/ML
5 INJECTION, SOLUTION INTRAVENOUS EVERY 10 MIN PRN
Status: DISCONTINUED | OUTPATIENT
Start: 2022-01-31 | End: 2022-01-31 | Stop reason: HOSPADM

## 2022-01-31 RX ORDER — PROMETHAZINE HYDROCHLORIDE 25 MG/ML
6.25 INJECTION, SOLUTION INTRAMUSCULAR; INTRAVENOUS
Status: DISCONTINUED | OUTPATIENT
Start: 2022-01-31 | End: 2022-01-31 | Stop reason: HOSPADM

## 2022-01-31 RX ORDER — MAGNESIUM HYDROXIDE 1200 MG/15ML
LIQUID ORAL CONTINUOUS PRN
Status: COMPLETED | OUTPATIENT
Start: 2022-01-31 | End: 2022-01-31

## 2022-01-31 RX ADMIN — PROPOFOL 50 MG: 10 INJECTION, EMULSION INTRAVENOUS at 11:13

## 2022-01-31 RX ADMIN — PROPOFOL 50 MG: 10 INJECTION, EMULSION INTRAVENOUS at 11:05

## 2022-01-31 RX ADMIN — SODIUM CHLORIDE: 9 INJECTION, SOLUTION INTRAVENOUS at 10:01

## 2022-01-31 RX ADMIN — LIDOCAINE HYDROCHLORIDE 100 MG: 20 INJECTION, SOLUTION INFILTRATION; PERINEURAL at 10:57

## 2022-01-31 RX ADMIN — PROPOFOL 50 MG: 10 INJECTION, EMULSION INTRAVENOUS at 10:58

## 2022-01-31 ASSESSMENT — PULMONARY FUNCTION TESTS
PIF_VALUE: 0
PIF_VALUE: 1
PIF_VALUE: 0

## 2022-01-31 ASSESSMENT — PAIN DESCRIPTION - FREQUENCY: FREQUENCY: CONTINUOUS

## 2022-01-31 ASSESSMENT — PAIN DESCRIPTION - PAIN TYPE: TYPE: SURGICAL PAIN

## 2022-01-31 ASSESSMENT — PAIN SCALES - GENERAL
PAINLEVEL_OUTOF10: 0
PAINLEVEL_OUTOF10: 2

## 2022-01-31 ASSESSMENT — ENCOUNTER SYMPTOMS: SHORTNESS OF BREATH: 0

## 2022-01-31 ASSESSMENT — PAIN - FUNCTIONAL ASSESSMENT: PAIN_FUNCTIONAL_ASSESSMENT: 0-10

## 2022-01-31 ASSESSMENT — PAIN DESCRIPTION - ONSET: ONSET: ON-GOING

## 2022-01-31 ASSESSMENT — PAIN DESCRIPTION - LOCATION: LOCATION: VAGINA

## 2022-01-31 ASSESSMENT — LIFESTYLE VARIABLES: SMOKING_STATUS: 0

## 2022-01-31 ASSESSMENT — PAIN DESCRIPTION - DESCRIPTORS: DESCRIPTORS: DISCOMFORT

## 2022-01-31 ASSESSMENT — PAIN DESCRIPTION - PROGRESSION: CLINICAL_PROGRESSION: NOT CHANGED

## 2022-01-31 NOTE — PROGRESS NOTES
To pacu from OR. Pt awake, denies pain. Unable to visualize vulvar incision - no drainage noted. IV infusing. Monitor in sinus rhythm.

## 2022-01-31 NOTE — BRIEF OP NOTE
Brief Postoperative Note      Patient: Iram Emery  YOB: 1932  MRN: [id number]    Date of Procedure: 1/31/2022    Pre-Op Diagnosis: FUSION OF LABIA    Post-Op Diagnosis: Same       Procedure(s):  VULVAR BIOPSY AND SEPARATION OF VULVA, CYSTOSCOPY    Surgeon(s):  Fadumo Kellogg MD    Assistant:  Surgical Assistant: Dea Arteaga    Anesthesia: General    Estimated Blood Loss (mL): Minimal    Complications: None    Specimens:   ID Type Source Tests Collected by Time Destination   A : A) Vulvar biopsy Tissue Tissue SURGICAL PATHOLOGY Fadumo Kellogg MD 1/31/2022 1113        Implants:  * No implants in log *      Drains: * No LDAs found *    Findings: agglutination of the vulva    Electronically signed by Wes Crum MD on 1/31/2022 at 11:20 AM

## 2022-01-31 NOTE — ANESTHESIA PRE PROCEDURE
Department of Anesthesiology  Preprocedure Note       Name:  Niall Carpenter   Age:  80 y.o.  :  3/18/1932                                          MRN:  [id number]         Date:  2022      Surgeon: Justin Webb):  Farrah Burt MD    Procedure: Procedure(s):  VULVAR BIOPSY AND SEPARATION OF VULVA    Medications prior to admission:   Prior to Admission medications    Medication Sig Start Date End Date Taking?  Authorizing Provider   Biotin 1000 MCG TABS Take 1 tablet by mouth daily   Yes Historical Provider, MD   Cholecalciferol (VITAMIN D3) 125 MCG (5000 UT) TABS Take 1 tablet by mouth daily   Yes Historical Provider, MD Looney Broad Oil (OMEGA-3) 500 MG CAPS Take 1 tablet by mouth daily 4 IN    Yes Historical Provider, MD   Multiple Vitamins-Minerals (CENTRUM SILVER PO) Take 1 tablet by mouth daily   Yes Historical Provider, MD   senna-docusate (Camille Fern) 8.6-50 MG per tablet Take 1 tablet by mouth daily as needed   Yes Historical Provider, MD   psyllium (METAMUCIL) 58.6 % packet Take 1 packet by mouth as needed    Yes Historical Provider, MD   Gaylan Nyhan 0.02-0.005 % SOLN Take 1 drop by mouth at bedtime  21  Yes Historical Provider, MD   aspirin 81 MG chewable tablet Take 81 mg by mouth nightly    Yes Historical Provider, MD   Ascorbic Acid (VITAMIN C) 1000 MG tablet Take 1,000 mg by mouth daily    Yes Historical Provider, MD   pyridoxine (B-6) 100 MG tablet Take 100 mg by mouth daily   Yes Historical Provider, MD   cyanocobalamin 1000 MCG tablet Take 1,000 mcg by mouth daily   Yes Historical Provider, MD   dorzolamide-timolol (COSOPT) 22.3-6.8 MG/ML ophthalmic solution Place 1 drop into both eyes 2 times daily  19  Yes Historical Provider, MD   pilocarpine (PILOCAR) 2 % ophthalmic solution Place 1 drop into the right eye 2 times daily  19  Yes Historical Provider, MD   Multiple Vitamins-Minerals (VITEYES AREDS FORMULA) CAPS Take 1 capsule by mouth daily    Yes Historical Provider, MD UNABLE TO FIND Take 1 tablet by mouth daily Osteo biflex -TRIPLE STRENGTH    Historical Provider, MD   latanoprost (XALATAN) 0.005 % ophthalmic solution Place 1 drop into the right eye nightly     Historical Provider, MD       Current medications:    Current Facility-Administered Medications   Medication Dose Route Frequency Provider Last Rate Last Admin    0.9 % sodium chloride infusion   IntraVENous Continuous Mirtha Wahl MD        sodium chloride flush 0.9 % injection 5-40 mL  5-40 mL IntraVENous 2 times per day Mirtha Wahl MD        sodium chloride flush 0.9 % injection 5-40 mL  5-40 mL IntraVENous PRN Mirtha Wahl MD        0.9 % sodium chloride infusion  25 mL IntraVENous PRN Mirtha Wahl MD        0.9 % sodium chloride infusion  25 mL IntraVENous PRN Scarlett Magana MD        sodium chloride flush 0.9 % injection 5-40 mL  5-40 mL IntraVENous 2 times per day Scarlett Magana MD        sodium chloride flush 0.9 % injection 5-40 mL  5-40 mL IntraVENous PRN Scarlett Magana MD           Allergies:     Allergies   Allergen Reactions    Codeine Nausea Only and Nausea And Vomiting     \"feels like I'm going to pass out\"    Morphine And Related Nausea And Vomiting and Other (See Comments)     \"passing out\"         Problem List:    Patient Active Problem List   Diagnosis Code    Idiopathic acute pancreatitis K85.00    Acute gallstone pancreatitis K85.10    Nausea and vomiting R11.2       Past Medical History:        Diagnosis Date    Constipation     OCCAS    Glaucoma     Thyroid disease     NO MEDS-BORDERLINE    Urinary leakage        Past Surgical History:        Procedure Laterality Date    ANTERIOR CRUCIATE LIGAMENT REPAIR Right     APPENDECTOMY      WITH COLON SURGERY    CHOLECYSTECTOMY      COLON SURGERY  1989    COLON RUPTURE WITH COLOSTOMY    COLONOSCOPY      SEVERAL    EYE SURGERY      cataract    OTHER SURGICAL HISTORY      COLOSTOMY REVERSAL    OTHER SURGICAL HISTORY      HEMTOMA-ABOVE FOREHEAD AFTER FALL SURGERY-I&D       Social History:    Social History     Tobacco Use    Smoking status: Never Smoker    Smokeless tobacco: Never Used   Substance Use Topics    Alcohol use: Yes     Comment: occassionally                                Counseling given: Not Answered      Vital Signs (Current):   Vitals:    01/24/22 0958 01/31/22 0941   BP:  (!) 146/87   Pulse:  80   Resp:  15   Temp:  96.5 °F (35.8 °C)   TempSrc:  Temporal   SpO2:  99%   Weight: 125 lb (56.7 kg) 129 lb 10.1 oz (58.8 kg)   Height: 5' 6\" (1.676 m) 5' 6\" (1.676 m)                                              BP Readings from Last 3 Encounters:   01/31/22 (!) 146/87   10/12/19 (!) 140/61   06/28/19 (!) 162/88       NPO Status:                                                                                 BMI:   Wt Readings from Last 3 Encounters:   01/31/22 129 lb 10.1 oz (58.8 kg)   10/12/19 122 lb 12.7 oz (55.7 kg)   06/28/19 122 lb (55.3 kg)     Body mass index is 20.92 kg/m². CBC:   Lab Results   Component Value Date    WBC 5.5 10/12/2019    RBC 3.26 10/12/2019    HGB 10.5 10/12/2019    HCT 31.8 10/12/2019    MCV 97.4 10/12/2019    RDW 14.4 10/12/2019     10/12/2019       CMP:   Lab Results   Component Value Date     10/12/2019    K 3.6 10/12/2019    K 4.3 10/11/2019     10/12/2019    CO2 24 10/12/2019    BUN 13 10/12/2019    CREATININE 0.8 10/12/2019    GFRAA >60 10/12/2019    AGRATIO 1.5 06/08/2019    LABGLOM >60 10/12/2019    GLUCOSE 88 10/12/2019    PROT 5.6 10/12/2019    CALCIUM 9.1 10/12/2019    BILITOT 0.7 10/12/2019    ALKPHOS 67 10/12/2019    AST 70 10/12/2019    ALT 58 10/12/2019       POC Tests: No results for input(s): POCGLU, POCNA, POCK, POCCL, POCBUN, POCHEMO, POCHCT in the last 72 hours.     Coags: No results found for: PROTIME, INR, APTT    HCG (If Applicable): No results found for: PREGTESTUR, PREGSERUM, HCG, HCGQUANT     ABGs: No results found for: PHART, PO2ART, OXU4PNF, XEG1SOV, BEART, E8TYOBBL     Type & Screen (If Applicable):  No results found for: LABABO, LABRH    Drug/Infectious Status (If Applicable):  No results found for: HIV, HEPCAB    COVID-19 Screening (If Applicable): No results found for: COVID19        Anesthesia Evaluation  Patient summary reviewed no history of anesthetic complications:   Airway: Mallampati: II  TM distance: >3 FB   Neck ROM: full  Comment: Torus on roof of mouth  Mouth opening: > = 3 FB Dental: normal exam         Pulmonary:Negative Pulmonary ROS       (-) shortness of breath and not a current smoker          Patient did not smoke on day of surgery. Cardiovascular:Negative CV ROS        (-) pacemaker, past MI, CABG/stent and  angina       Beta Blocker:  Not on Beta Blocker         Neuro/Psych:      (-) seizures and CVA            ROS comment: Glaucoma   GI/Hepatic/Renal: Neg GI/Hepatic/Renal ROS       (-) liver disease and no renal disease       Endo/Other: Negative Endo/Other ROS       (-) diabetes mellitus, hypothyroidism, hyperthyroidism               Abdominal:             Vascular: negative vascular ROS. Other Findings:             Anesthesia Plan      MAC     ASA 2       Induction: intravenous. Anesthetic plan and risks discussed with patient. Plan discussed with CRNA. This pre-anesthesia assessment may be used as a history and physical.    DOS STAFF ADDENDUM:    Pt seen and examined, chart reviewed (including anesthesia, drug and allergy history). No interval changes to history and physical examination. Anesthetic plan, risks, benefits, alternatives, and personnel involved discussed with patient. Patient verbalized an understanding and agrees to proceed.       Travis Courtney MD  January 31, 2022  9:47 AM

## 2022-01-31 NOTE — PROGRESS NOTES
Vaginal prep completed pre-op. Sponges counted with Pennie Laws RN and Lawrence Eid RN. Counts correct. Vaginal sweep completed by Dr. Peggy Harrison. No tears or foreign objects noted.  Electronically signed by Ethelda Cockayne, RN on 1/31/2022 at 11:30 AM

## 2022-01-31 NOTE — H&P
Date of Surgery Update:  Estrella Nance was seen, history and physical examination reviewed, and patient examined by me today. There have been no significant clinical changes since the completion of the previous history and physical. The surgical site was confirmed by the patient and me. I have presented reasonable alternatives to the patient's proposed care, treatment, and services. The discussion I have done encompassed risks, benefits, and side effects related to the alternatives and the risks related to not receiving the proposed care, treatment, and services. All questions answered. Patient wishes to proceed.      Electronically signed by: Paige Yousif MD,1/31/2022,10:22 AM

## 2022-01-31 NOTE — PROGRESS NOTES
Pt awake. VSS. Discharge instructions reviewed with pt and daughter. Both express an understanding of instructions. Urge to void. Walked pt to bathroom and voided. Moderate blood tinge urine, no clots. Peripad placed with depends. Back to room and pt sitting up in recliner. Daughter assisting pt to dress.

## 2022-02-02 ENCOUNTER — TELEPHONE (OUTPATIENT)
Dept: UROGYNECOLOGY | Age: 87
End: 2022-02-02

## 2022-02-02 RX ORDER — CLOBETASOL PROPIONATE 0.5 MG/G
CREAM TOPICAL
Qty: 45 G | Refills: 2 | Status: SHIPPED | OUTPATIENT
Start: 2022-02-02

## 2022-02-02 NOTE — TELEPHONE ENCOUNTER
Called and advised of biopsy results, sent in clobetasol for her to [facility]

## 2022-02-07 NOTE — OP NOTE
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 URINE OUTPUT: Not recorded milliters(s)   Output by Drain (mL) 02/05/22 0701 - 02/05/22 1900 02/05/22 1901 - 02/06/22 0700 02/06/22 0701 - 02/06/22 1900 02/06/22 1901 - 02/07/22 0700 02/07/22 0701 - 02/07/22 1212   Patient has no LDAs of requested type attached. Complications: None    Specimens:   ID Type Source Tests Collected by Time Destination   A : A) Vulvar biopsy Tissue Tissue SURGICAL PATHOLOGY Scarlett Magana MD 1/31/2022 1113        Implants: * No implants in log *      Drains:   [REMOVED] Urethral Catheter 14 fr (Removed)       FINDINGS: Agglutination of the vagina so that only a small Q-tip could be passed through. INDICATION FOR PROCEDURE: 80y.o. year old patient who presented with symptomatic urinary incontinence. 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 There was only a small area open into the vagina. Because it was so uncomfortable to attempt to separate this in the office the patient agreed to come to the operating room for separation of the labia. The patient understood all of these risks and desired to proceed. OPERATIVE NOTE:   The patient was taken to the operating room and general anesthesia was found to be adequate.  She was prepped and draped in the normal sterile fashion and placed in [address] [place] [address]. Examination again revealed agglutination of the entire labia minora as well as the clitoral jon. I took 4 x 4 sponges in each hand and placed them close to the opening of the vagina and then pulled laterally  the majority of the agglutination so that the vagina was completely at exposed. There was no bleeding or tearing. The clitoral jon area could not be  off of the clitoris. On the patient's left labia near the posterior fourchette I did take a small biopsy after injecting with 1% lidocaine with epinephrine. I then closed this with 3-0 Vicryl. CYSTOURETHROSCOPY:  Cystourethroscopy was performed to confirm no injury to the bladder or urethra. The bilateral ureters were noted to efflux strongly. The patient tolerated the procedure well was taken to the recovery room in excellent condition.     Electronically signed by Steffanie Greene MD on 2/7/2022 at 12:12 PM

## 2022-02-15 ENCOUNTER — OFFICE VISIT (OUTPATIENT)
Dept: UROGYNECOLOGY | Age: 87
End: 2022-02-15

## 2022-02-15 VITALS
BODY MASS INDEX: 20.98 KG/M2 | SYSTOLIC BLOOD PRESSURE: 170 MMHG | WEIGHT: 130 LBS | HEART RATE: 71 BPM | DIASTOLIC BLOOD PRESSURE: 83 MMHG

## 2022-02-15 DIAGNOSIS — Z09 POSTOP CHECK: Primary | ICD-10-CM

## 2022-02-15 DIAGNOSIS — N39.41 URGE INCONTINENCE: ICD-10-CM

## 2022-02-15 DIAGNOSIS — L90.0 LICHEN SCLEROSUS ET ATROPHICUS: ICD-10-CM

## 2022-02-15 PROCEDURE — 99024 POSTOP FOLLOW-UP VISIT: CPT | Performed by: NURSE PRACTITIONER

## 2022-02-15 RX ORDER — CLOBETASOL PROPIONATE 0.5 MG/G
CREAM TOPICAL
COMMUNITY
Start: 2022-02-07

## 2022-02-15 NOTE — PROGRESS NOTES
2/15/2022       HPI:     Name: Sylvester Goode  YOB: 1932    CC: Sylvester Goode is a 80 y.o. female who is here for a post op evaluation. HPI: Recently underwent vulvar biopsy and separation of vulva on 1/31/22. .  Voiding difficulty: No  Initiating stream: No  Force stream: No  Lean forward to empty: Yes  Slow/intermittent stream: No  Unable to empty bladder: Yes  Incontinence: urge present prior to surgery  Urgency without incontinence: Yes   Frequency without incontinence: Yes present prior to surgery  Bowel functions: normal   Pain Controlled: Yes    Past Medical History:   Past Medical History:   Diagnosis Date    Constipation     OCCAS    Frequent urination 02/15/2022    Glaucoma     Thyroid disease     NO MEDS-BORDERLINE    Urinary leakage      Past Surgical History:   Past Surgical History:   Procedure Laterality Date    ANTERIOR CRUCIATE LIGAMENT REPAIR Right     APPENDECTOMY      WITH COLON SURGERY    CHOLECYSTECTOMY      COLON SURGERY  1989    COLON RUPTURE WITH COLOSTOMY    COLONOSCOPY      SEVERAL    EYE SURGERY      cataract    OTHER SURGICAL HISTORY      COLOSTOMY REVERSAL    OTHER SURGICAL HISTORY      HEMTOMA-ABOVE FOREHEAD AFTER FALL SURGERY-I&D    VULVAR/PERINEAL BIOPSY N/A 1/31/2022    VULVAR BIOPSY AND SEPARATION OF VULVA, CYSTOSCOPY performed by Ellen Hudson MD at [place] [id number]     Current Medications:  Current Outpatient Medications   Medication Sig Dispense Refill    clobetasol (TEMOVATE) 0.05 % cream APPLY VAGINALLY TWICE DAILY FOR 5 WEEKS, THEN EVERY OTHER DAY FOR 2 WEEKS, THEN JUST 1 TO 2 TIMES WEEKLY FOR MAINTENANCE      clobetasol prop emollient base 0.05 % CREA Apply vaginally twice daily for 5 weeks, then every other day for 2 weeks, then just 1 to 2 times weekly for maintenance.  45 g 2    Biotin 1000 MCG TABS Take 1 tablet by mouth daily      Cholecalciferol (VITAMIN D3) 125 MCG (5000 UT) TABS Take 1 tablet by mouth daily      Krill Oil (OMEGA-3) 500 MG CAPS Take 1 tablet by mouth daily 4 IN 1      Multiple Vitamins-Minerals (CENTRUM SILVER PO) Take 1 tablet by mouth daily      senna-docusate (PERICOLACE) 8.6-50 MG per tablet Take 1 tablet by mouth daily as needed      psyllium (METAMUCIL) 58.6 % packet Take 1 packet by mouth as needed       ROCKLATAN 0.02-0.005 % SOLN Take 1 drop by mouth at bedtime       UNABLE TO FIND Take 1 tablet by mouth daily Osteo biflex -TRIPLE STRENGTH      aspirin 81 MG chewable tablet Take 81 mg by mouth nightly       Ascorbic Acid (VITAMIN C) 1000 MG tablet Take 1,000 mg by mouth daily       pyridoxine (B-6) 100 MG tablet Take 100 mg by mouth daily      latanoprost (XALATAN) 0.005 % ophthalmic solution Place 1 drop into the right eye nightly       cyanocobalamin 1000 MCG tablet Take 1,000 mcg by mouth daily      dorzolamide-timolol (COSOPT) 22.3-6.8 MG/ML ophthalmic solution Place 1 drop into both eyes 2 times daily       pilocarpine (PILOCAR) 2 % ophthalmic solution Place 1 drop into the right eye 2 times daily       Multiple Vitamins-Minerals (VITEYES AREDS FORMULA) CAPS Take 1 capsule by mouth daily        No current facility-administered medications for this visit. Allergies: Allergies   Allergen Reactions    Codeine Nausea Only and Nausea And Vomiting     \"feels like I'm going to pass out\"    Morphine And Related Nausea And Vomiting and Other (See Comments)     \"passing out\"       Social History:   Social History     Socioeconomic History    Marital status:       Spouse name: Not on file    Number of children: Not on file    Years of education: Not on file    Highest education level: Not on file   Occupational History    Not on file   Tobacco Use    Smoking status: Never Smoker    Smokeless tobacco: Never Used   Vaping Use    Vaping Use: Never used   Substance and Sexual Activity    Alcohol use: Yes     Comment: occassionally    Drug use: Never    Sexual activity: Not Currently   Other Topics Concern    Not on file   Social History Narrative    Not on file     Social Determinants of Health     Financial Resource Strain:     Difficulty of Paying Living Expenses: Not on file   Food Insecurity:     Worried About Running Out of Food in the Last Year: Not on file    Sami of Food in the Last Year: Not on file   Transportation Needs:     Lack of Transportation (Medical): Not on file    Lack of Transportation (Non-Medical): Not on file   Physical Activity:     Days of Exercise per Week: Not on file    Minutes of Exercise per Session: Not on file   Stress:     Feeling of Stress : Not on file   Social Connections:     Frequency of Communication with Friends and Family: Not on file    Frequency of Social Gatherings with Friends and Family: Not on file    Attends Hoahaoism Services: Not on file    Active Member of [address] [place] [address] or Organizations: Not on file    Attends Club or Organization Meetings: Not on file    Marital Status: Not on file   Intimate Partner Violence:     Fear of Current or Ex-Partner: Not on file    Emotionally Abused: Not on file    Physically Abused: Not on file    Sexually Abused: Not on file   Housing Stability:     Unable to Pay for Housing in the Last Year: Not on file    Number of [place] in the Last Year: Not on file    Unstable Housing in the Last Year: Not on file     Family History:   Family History   Problem Relation Age of Onset    Lung Cancer Brother     Heart Attack Brother     Diabetes Brother     Stroke Brother     Emphysema Father     Cancer Brother     Lung Cancer Brother      Review of System   Review of Systems   Genitourinary: Positive for frequency and urgency. All other systems reviewed and are negative. A review of systems was done by the patient and reviewed by me and scanned into media today.     Objective:     Vitals  Vitals:    02/15/22 1036   BP: (!) 170/83   Pulse: 71   Weight: 130 lb (59 kg)     Physical Exam  Physical Exam  Vitals and nursing note reviewed. Constitutional:       Appearance: Normal appearance. HENT:      Head: Normocephalic. Eyes:      Conjunctiva/sclera: Conjunctivae normal.   Cardiovascular:      Rate and Rhythm: Normal rate. Pulmonary:      Effort: Pulmonary effort is normal.   Genitourinary:     Comments: Vulva: healing well  Musculoskeletal:         General: Normal range of motion. Cervical back: Normal range of motion. Skin:     General: Skin is warm and dry. Neurological:      General: No focal deficit present. Mental Status: She is alert. Psychiatric:         Mood and Affect: Mood normal.         Behavior: Behavior normal.       All surgical incisions healing well. No erythema. No evidence of hematoma or abscess. No results found for this visit on 02/15/22. Assessnent/Plan:     Ravi Velasquez is a 80 y.o. female with No diagnosis found. Patient is doing well 2 weeks. Surgical pathology reviewed with her and daughter Sirisha Carter. She is using her Clobetasol twice daily at this time and we did review instructions on where to place during her exam.   U/F:  She is very bothered by her U/F which is interfering with her social life. Advised I would like to further investigat this after her vulva heals a bit more and we can do simple CMG at her next visit. Patient is to follow up in 2-3weeks . No orders of the defined types were placed in this encounter. No orders of the defined types were placed in this encounter.       Luis Malagon, [facility] - CNP

## 2022-03-01 ENCOUNTER — OFFICE VISIT (OUTPATIENT)
Dept: UROGYNECOLOGY | Age: 87
End: 2022-03-01
Payer: MEDICARE

## 2022-03-01 VITALS
BODY MASS INDEX: 20.89 KG/M2 | SYSTOLIC BLOOD PRESSURE: 169 MMHG | DIASTOLIC BLOOD PRESSURE: 78 MMHG | HEART RATE: 72 BPM | HEIGHT: 66 IN | WEIGHT: 130 LBS

## 2022-03-01 DIAGNOSIS — Z09 POSTOP CHECK: ICD-10-CM

## 2022-03-01 DIAGNOSIS — R39.15 URINARY URGENCY: ICD-10-CM

## 2022-03-01 DIAGNOSIS — R35.0 URINARY FREQUENCY: Primary | ICD-10-CM

## 2022-03-01 LAB
BILIRUBIN, POC: NORMAL
BLOOD URINE, POC: NORMAL
CLARITY, POC: NORMAL
COLOR, POC: NORMAL
EMPTY COUGH STRESS TEST: NORMAL
FIRST SENSATION: 125 CC
FULL COUGH STRESS TEST: NORMAL
GLUCOSE URINE, POC: NORMAL
KETONES, POC: NORMAL
LEUKOCYTE EST, POC: NORMAL
MAX SENSATION: 210 CC
NITRATE, URINE POC: NORMAL
NITRITE, POC: NORMAL
PH, POC: NORMAL
POST VOID RESIDUAL (PVR): 10 ML
PROTEIN, POC: NORMAL
RBC URINE, POC: NORMAL
SECOND SENSATION: 200 CC
SPASM: NORMAL
SPECIFIC GRAVITY, POC: NORMAL
UROBILINOGEN, POC: NORMAL
WBC URINE, POC: NORMAL

## 2022-03-01 PROCEDURE — 51725 SIMPLE CYSTOMETROGRAM: CPT | Performed by: NURSE PRACTITIONER

## 2022-03-01 PROCEDURE — G8427 DOCREV CUR MEDS BY ELIG CLIN: HCPCS | Performed by: NURSE PRACTITIONER

## 2022-03-01 PROCEDURE — 81002 URINALYSIS NONAUTO W/O SCOPE: CPT | Performed by: NURSE PRACTITIONER

## 2022-03-01 PROCEDURE — 4040F PNEUMOC VAC/ADMIN/RCVD: CPT | Performed by: NURSE PRACTITIONER

## 2022-03-01 PROCEDURE — 99213 OFFICE O/P EST LOW 20 MIN: CPT | Performed by: NURSE PRACTITIONER

## 2022-03-01 PROCEDURE — G8484 FLU IMMUNIZE NO ADMIN: HCPCS | Performed by: NURSE PRACTITIONER

## 2022-03-01 PROCEDURE — 1036F TOBACCO NON-USER: CPT | Performed by: NURSE PRACTITIONER

## 2022-03-01 PROCEDURE — 1123F ACP DISCUSS/DSCN MKR DOCD: CPT | Performed by: NURSE PRACTITIONER

## 2022-03-01 PROCEDURE — 1090F PRES/ABSN URINE INCON ASSESS: CPT | Performed by: NURSE PRACTITIONER

## 2022-03-01 PROCEDURE — G8420 CALC BMI NORM PARAMETERS: HCPCS | Performed by: NURSE PRACTITIONER

## 2022-03-01 NOTE — PROGRESS NOTES
3/2/2022     HPI:     Name: Shashi Luis  YOB: 1932    CC: Shashi Luis is a 80 y.o. female who is here for a post op evaluation. HPI: Recently underwent vulvar biopsy and separation of vulva on 1/31/22. Daughter Magda Arndt present today    She remains very bothered by her urinary incontinence including urinary urge, frequency and urge incontinence which she initially presented for help with. However her exam revealed significant vaginal adhesions/agglutination and this was treated with surgery and biopsy. She has been using her clobetasol for pathology diagnosis of lichen. She is asymptomatic.     She is very bothered by her incontinence and returns today for simple cystometrogram and plan    Voiding difficulty: No  Initiating stream: No  Force stream: No  Lean forward to empty: Yes  Slow/intermittent stream: No  Unable to empty bladder: Yes  Incontinence: urge present prior to surgery  Urgency without incontinence: Yes   Frequency without incontinence: Yes present prior to surgery  Bowel functions: normal   Pain Controlled: Yes    Past Medical History:   Past Medical History:   Diagnosis Date    Constipation     OCCAS    Frequent urination 02/15/2022    Frequent urination 03/01/2022    Glaucoma     Loss of bladder control 03/01/2022    Thyroid disease     NO MEDS-BORDERLINE    Urinary leakage      Past Surgical History:   Past Surgical History:   Procedure Laterality Date    ANTERIOR CRUCIATE LIGAMENT REPAIR Right     APPENDECTOMY      WITH COLON SURGERY    CHOLECYSTECTOMY      COLON SURGERY  1989    COLON RUPTURE WITH COLOSTOMY    COLONOSCOPY      SEVERAL    EYE SURGERY      cataract    OTHER SURGICAL HISTORY      COLOSTOMY REVERSAL    OTHER SURGICAL HISTORY      HEMTOMA-ABOVE FOREHEAD AFTER FALL SURGERY-I&D    VULVAR/PERINEAL BIOPSY N/A 1/31/2022    VULVAR BIOPSY AND SEPARATION OF VULVA, CYSTOSCOPY performed by Shakeel Kline MD at [address] [place] [address],[address] Medications:  Current Outpatient Medications   Medication Sig Dispense Refill    mirabegron (MYRBETRIQ) 25 MG TB24 Take 1 tablet by mouth daily 30 tablet 1    clobetasol (TEMOVATE) 0.05 % cream APPLY VAGINALLY TWICE DAILY FOR 5 WEEKS, THEN EVERY OTHER DAY FOR 2 WEEKS, THEN JUST 1 TO 2 TIMES WEEKLY FOR MAINTENANCE      clobetasol prop emollient base 0.05 % CREA Apply vaginally twice daily for 5 weeks, then every other day for 2 weeks, then just 1 to 2 times weekly for maintenance. 45 g 2    Biotin 1000 MCG TABS Take 1 tablet by mouth daily      Cholecalciferol (VITAMIN D3) 125 MCG (5000 UT) TABS Take 1 tablet by mouth daily      Krill Oil (OMEGA-3) 500 MG CAPS Take 1 tablet by mouth daily 4 IN 1      Multiple Vitamins-Minerals (CENTRUM SILVER PO) Take 1 tablet by mouth daily      senna-docusate (PERICOLACE) 8.6-50 MG per tablet Take 1 tablet by mouth daily as needed      psyllium (METAMUCIL) 58.6 % packet Take 1 packet by mouth as needed       ROCKLATAN 0.02-0.005 % SOLN Take 1 drop by mouth at bedtime       UNABLE TO FIND Take 1 tablet by mouth daily Osteo biflex -TRIPLE STRENGTH      aspirin 81 MG chewable tablet Take 81 mg by mouth nightly       Ascorbic Acid (VITAMIN C) 1000 MG tablet Take 1,000 mg by mouth daily       pyridoxine (B-6) 100 MG tablet Take 100 mg by mouth daily      latanoprost (XALATAN) 0.005 % ophthalmic solution Place 1 drop into the right eye nightly       cyanocobalamin 1000 MCG tablet Take 1,000 mcg by mouth daily      dorzolamide-timolol (COSOPT) 22.3-6.8 MG/ML ophthalmic solution Place 1 drop into both eyes 2 times daily       pilocarpine (PILOCAR) 2 % ophthalmic solution Place 1 drop into the right eye 2 times daily       Multiple Vitamins-Minerals (VITEYES AREDS FORMULA) CAPS Take 1 capsule by mouth daily        No current facility-administered medications for this visit. Allergies:    Allergies   Allergen Reactions    Codeine Nausea Only and Nausea And Vomiting \"feels like I'm going to pass out\"    Morphine And Related Nausea And Vomiting and Other (See Comments)     \"passing out\"       Social History:   Social History     Socioeconomic History    Marital status:      Spouse name: Not on file    Number of children: Not on file    Years of education: Not on file    Highest education level: Not on file   Occupational History    Not on file   Tobacco Use    Smoking status: Never Smoker    Smokeless tobacco: Never Used   Vaping Use    Vaping Use: Never used   Substance and Sexual Activity    Alcohol use: Yes     Comment: occassionally    Drug use: Never    Sexual activity: Not Currently   Other Topics Concern    Not on file   Social History Narrative    Not on file     Social Determinants of Health     Financial Resource Strain:     Difficulty of Paying Living Expenses: Not on file   Food Insecurity:     Worried About Running Out of Food in the Last Year: Not on file    Sami of Food in the Last Year: Not on file   Transportation Needs:     Lack of Transportation (Medical): Not on file    Lack of Transportation (Non-Medical):  Not on file   Physical Activity:     Days of Exercise per Week: Not on file    Minutes of Exercise per Session: Not on file   Stress:     Feeling of Stress : Not on file   Social Connections:     Frequency of Communication with Friends and Family: Not on file    Frequency of Social Gatherings with Friends and Family: Not on file    Attends Shinto Services: Not on file    Active Member of Clubs or Organizations: Not on file    Attends Club or Organization Meetings: Not on file    Marital Status: Not on file   Intimate Partner Violence:     Fear of Current or Ex-Partner: Not on file    Emotionally Abused: Not on file    Physically Abused: Not on file    Sexually Abused: Not on file   Housing Stability:     Unable to Pay for Housing in the Last Year: Not on file    Number of [place] in the Last Year: Not on file    Unstable Housing in the Last Year: Not on file     Family History:   Family History   Problem Relation Age of Onset    Lung Cancer Brother     Heart Attack Brother     Diabetes Brother     Stroke Brother     Emphysema Father     Cancer Brother     Lung Cancer Brother      Review of System   Review of Systems      A review of systems was done by the patient and reviewed by me and scanned into media today. Objective:     Vitals  Vitals:    03/01/22 0824   BP: (!) 169/78   Pulse: 72   Weight: 130 lb (59 kg)   Height: 5' 6\" (1.676 m)     Physical Exam  Physical Exam  Vitals and nursing note reviewed. Constitutional:       Appearance: Normal appearance. HENT:      Head: Normocephalic. Eyes:      Conjunctiva/sclera: Conjunctivae normal.   Cardiovascular:      Rate and Rhythm: Normal rate. Pulmonary:      Effort: Pulmonary effort is normal.   Genitourinary:     Comments: Vulva healing well  Introitus remains very small. Able to visualize urethra  Musculoskeletal:         General: Normal range of motion. Cervical back: Normal range of motion. Skin:     General: Skin is warm and dry. Neurological:      General: No focal deficit present. Mental Status: She is alert. Psychiatric:         Mood and Affect: Mood normal.         Behavior: Behavior normal.       All surgical incisions healing well. No erythema. No evidence of hematoma or abscess.   Cystometrogram   wbc urine, poc   Date Value Ref Range Status   03/01/2022 neg  Final     RBC, UA   Date Value Ref Range Status   06/08/2019 1 0 - 4 /HPF Final     rbc urine, poc   Date Value Ref Range Status   03/01/2022 neg  Final     Nitrate, UA POC   Date Value Ref Range Status   03/01/2022 neg  Final     post void residual   Date Value Ref Range Status   03/01/2022 10 ml Final     FIRST SENSATION   Date Value Ref Range Status   03/01/2022 125 cc Final     SECOND SENSATION   Date Value Ref Range Status   03/01/2022 200 cc Final     MAX We did further discuss Botox as well. She will return in 4 weeks if taking Myrbetriq or will schedule to see Dr. Radha Gonzalez if desires to move forward with Botox. [facility] Valverde CNP      Orders Placed This Encounter   Procedures    Culture, Urine     Order Specific Question:   Specify (ex-cath, midstream, cysto, etc)?      Answer:   straight catheter    Ambulatory referral to Physical Therapy     Referral Priority:   Routine     Referral Type:   Physical Therapy     Referral Reason:   Specialty Services Required     Referred to Provider:   Stefano Florence PT     Number of Visits Requested:   1    POCT Urinalysis no Micro    Cystometrogram     Orders Placed This Encounter   Medications    mirabegron (MYRBETRIQ) 25 MG TB24     Sig: Take 1 tablet by mouth daily     Dispense:  30 tablet     Refill:  [address], [facility]

## 2022-03-02 LAB — URINE CULTURE, ROUTINE: NORMAL

## 2022-03-16 ENCOUNTER — TELEPHONE (OUTPATIENT)
Dept: UROGYNECOLOGY | Age: 87
End: 2022-03-16

## 2022-03-16 RX ORDER — CLOBETASOL PROPIONATE 0.5 MG/G
OINTMENT TOPICAL
Qty: 45 G | Refills: 2 | Status: SHIPPED | OUTPATIENT
Start: 2022-03-16

## 2022-03-17 NOTE — PROGRESS NOTES
[facility] [address] [facility] [address]  Phone: [phone]  Fax: [phone]                                                      Physical Therapy Certification    Dear Referring Practitioner: DENISE Johnson,    We had the pleasure of evaluating the following patient for physical therapy services at [place] [address]. A summary of our findings can be found in the initial assessment below. This includes our plan of care. If you have any questions or concerns regarding these findings, please do not hesitate to contact me at the office phone number checked above. Thank you for the referral.       Physician Signature:_______________________________Date:__________________  By signing above (or electronic signature), therapist's plan is approved by physician      Patient: Iliana Cruz   : 3/18/1932   MRN: [id number]  Referring Physician: Referring Practitioner: DENISE Johnson      Evaluation Date: 3/22/2022      Medical Diagnosis Information:  Diagnosis: R35.0- Urinary frequency; R39.15 - Urinary urgency                                             Insurance information: PT Insurance Information: Medicare    Second person requested for examination:  [x] No    [] Yes  But daughter accompanied patient to her appointment and stayed in the examine room   If yes, who was present:     Precautions/ Contra-indications: NA - lichen sclerosis    Latex Allergy:  [x]NO      []YES    Preferred Language for Healthcare:   [x]English       []Other:    C-SSRS Triggered by Intake questionnaire (Past 2 wk assessment ):   [x] No, Questionnaire did not trigger screening.   [] Yes, Patient intake triggered C-SSRS Screening     [] Completed, no further action required.    [] Completed, PCP notified via Epic    Functional Outcome:   PFDI-20: 95.83  Sub-sections: POPDI-6 Score 8.33; CRADI-8 Score: 25; SASCHA-6 Score: 62. 5      SUBJECTIVE: Patient stated complaint:urinating frequently for about the past year, urinary leakage when stands up/lifts/strains, dribbling after urination. Started taking bladder control medications for about 11 days, not noticing much of a difference and does not like the side effects. Severity of problem: 7-8/10  Patient's Goal: \"to have more control over when I go to the bathroom\"    Pregnancies: [x] No    [] Yes, if yes # 6  Deliveries: # and type: 4 live births, vaginally - 2 miscarriages  Menopause at age 47 yo, vaginal dryness, painful periods       4 week or greater of failed trial of PFPT program?   [x] No    [] Yes    PFPT program as defined by \"Completing 4 weeks of an ordered plan of pelvic muscle exercises designed to increase periurethral muscle strength\". Relevant Medical History:  Gunnar Ji, APRN-CNP on 3/1/22  CC: Madelyn Sahni is a 80 y.o. female who is here for a post op evaluation. HPI: Recently underwent vulvar biopsy and separation of vulva on 1/31/22.     Daughter Lilibeth Hernandez present today     She remains very bothered by her urinary incontinence including urinary urge, frequency and urge incontinence which she initially presented for help with. However her exam revealed significant vaginal adhesions/agglutination and this was treated with surgery and biopsy. She has been using her clobetasol for pathology diagnosis of lichen. She is asymptomatic.     She is very bothered by her incontinence and returns today for simple cystometrogram and plan     Voiding difficulty: No  Initiating stream: No  Force stream: No  Lean forward to empty: Yes  Slow/intermittent stream: No  Unable to empty bladder: Yes  Incontinence: urge present prior to surgery  Urgency without incontinence: Yes      Frequency without incontinence: Yes present prior to surgery  Bowel functions: normal           Pain Controlled:  Yes     Past Medical History:   Past Medical History        Past Medical History:   Diagnosis Date    Constipation       OCCAS    Frequent urination 02/15/2022    Frequent urination 03/01/2022    Glaucoma      Loss of bladder control 03/01/2022    Thyroid disease       NO MEDS-BORDERLINE    Urinary leakage           Past Surgical History:   Past Surgical History         Past Surgical History:   Procedure Laterality Date    ANTERIOR CRUCIATE LIGAMENT REPAIR Right      APPENDECTOMY         WITH COLON SURGERY    CHOLECYSTECTOMY        COLON SURGERY   1989     COLON RUPTURE WITH COLOSTOMY    COLONOSCOPY         SEVERAL    EYE SURGERY         cataract    OTHER SURGICAL HISTORY         COLOSTOMY REVERSAL    OTHER SURGICAL HISTORY         HEMTOMA-ABOVE FOREHEAD AFTER FALL SURGERY-I&D    VULVAR/PERINEAL BIOPSY N/A 1/31/2022     VULVAR BIOPSY AND SEPARATION OF VULVA, CYSTOSCOPY performed by Luba Hurd MD at [place] [address]         Physical Exam  Genitourinary:     Comments: Vulva healing well  Introitus remains very small. Able to visualize urethra  Assessment/Plan:      Luz Phelps is a 80 y.o. female with   1. Urinary frequency    2. Urinary urgency       Patient is healed well at 4 weeks and continues to use her clobetasol as instructed. -Simple cystometrogram reviewed with patient and daughter. She does have a small capacity and although did not have a spasm with testing she describes urinary urgency and inability to hold bladder when rises from chair. She does have open angle glaucoma and is using drops for dry eyes. She has mild HTN that she is not taking medication for.      -Urge Incontinence:   Diagnosis and pathophysiology of urge incontinence reviewed with patient. A handout on urge incontinence and Bladder Matters was provided to the patient.     The patient was counseled on the risk/benefits and side effects of anticholinergics including dry mouth, constipation, and the risk of falling as well as the risk of increased BP on Myrbetriq  She denies narrow angle glaucoma and this was confirmed by her optometrist   Efficacy expectations reviewed. Counseled on dietary modifications including limiting coffee, tea, soda, spicy and acidic food items. Advised of role for PFPT. She is going to consider her options for treatment and I did give her referral for PFPT as well as prescription for Myrbetriq. We did further discuss Botox as well.   She will return in 4 weeks if taking Myrbetriq or will schedule to see Dr. Burgos Fairly if desires to move forward with Botox.       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    Severity of problem: 7-8/10     Occupation/School: homemaker     Living Status/Prior Level of Function: Prior to this injury / incident, pt was independent with ADLs and IADLs, exercise 3 days/week bicycle or treadmill ~20 minutes at a time    OBJECTIVE:  Ortho Screen:  Posture - moderately rounded shoulders, mild forward head  Other Observation  Palpation - moderate tightness in bilateral upper traps  Alignment - L iliac crest elevated compared to R in standing    ROM LEFT RIGHT Comments   Cervical Side Bend   Mod to severe limitations   Cervical Rotation   Moderate limitations   Shoulder Flex   WFL   Shoulder Abd      Shoulder ER      Shoulder IR            Lumbar Side Bend   Moderate limitations   Lumbar Rotation   Mild to moderate limitations   Hip Flexion   WFL   Hip Abd      Hip ER      Hip IR      Hip Extension      Knee Ext   Moderate limitations   Knee Flex            Hamstring Flex   Moderate to severe limitations   Piriformis   Moderate to severe limitations                   Strength LEFT RIGHT Comments   Shoulder flexion   4/5   Shoulder scaption      Shoulder ER      Shoulder IR      Biceps   4/5   Triceps   4/5               Multifidus      Transverse Ab   See below   Hip Flexors   4/5 Hip Abductors   4/5   Hip Extensors      Hip Internal Rotators   4/5   Hip External Rotators   4/5     TA Muscle Contraction Scale    Criteria                                               Score  Quality of Contraction   Not Present      [] 0   Rapid, Superificial     [] 1   Just Perceptible     [x] 2     Gentle, Slow                [] 3    Substitution   Resting       [] 0   Moderate to Strong                           [x] 1    Subtle Perceptible     [] 2   None                  [] 3    Symmetry of Contraction   Unilateral                  [] 0   Bilateral/Asymmetrical                [] 1   Symmetrical                  [x] 2    Breathing     Inability/Difficulty Breathing during contraction                [x] 0   Able to hold contraction while Breathing             [] 1    Holding   Able to Hold Contraction <10 s                         [x] 0   Able to Hold Contraction >10 s               [] 1      5/10  Adapted from Bruce Grewal al, Copyright 2009      Abdominals:  Scarring:   [] No    [x] Yes - abdominal scarring due to colonoscopy and reversal - large vertical incision from umbilicus to suprapubic area with moderate adhesions, ~2\" scar horizontal in middle of L side of abdomen from temporary colostomy   Diastasis:   [x] No    [] Yes  Functional stability:   [x] No    [] Yes - moderate limitations solange with coordination deficits noted with attempts to contract TA    Pelvic Floor:  Observation  Skin condition - pink to whitish tissue noted with adhesion around clitoral head and urethra   Scarring - adhesions at noted due to lichen sclerosis  Perineal descent  - moderated to severe limitations, tendency toward reflexive contraction  External clock - moderate to severe tightness and tenderness in transverse perineal and perineal body, L>R  Contraction voluntary/reflexive - significant/extension use of accessory muscles  Relaxation voluntary/bear down - reflexive contraction noted, again with significant use of accessory muscles    Internal Assessment  Introitus - moderate tightness noted, likely due to surrounding lichen sclerosis lesions and scarring  Vaginal vault - moderate atrohpy  Internal clock   Superficial - moderate to severe tightness and tenderness at 4-6 o'clock with tight tissue bands noted    Middle/Deep - mild to moderate in R PF, moderate to severe in L PF but did improve slight with bimanual release    Prolapse Test - mild pelvic organ descent with cough, improved with cue for quick flick PF contraction in preparation for increased abdominal pressure  Quality of contraction - poor - significant contraction of accessory muscles with little activation at superficial PF muscles  Coordination - moderate limitations    PERF score  Power - 1/5  Endurance - 1-2 seconds  Repetitions - 1 - did not attempt max reps due to PF muscle tightness noted   Quick Flicks - 3 reps x 2 sets - sluggish and very limited ability to contract - mostly posterior fibers likely overflow from external anal sphincter                         [x] Patient history, allergies, meds reviewed. Medical chart reviewed. See intake form. Review Of Systems (ROS):  [x]Performed Review of systems (Integumentary, CardioPulmonary, Neurological) by intake and observation. Intake form has been scanned into medical record. Patient has been instructed to contact their primary care physician regarding ROS issues if not already being addressed at this time.       Co-morbidities/Complexities (which will affect course of rehabilitation):   []None        []Hx of COVID   Arthritic conditions   []Rheumatoid arthritis (M05.9)  []Osteoarthritis (M19.91)  []Gout   Cardiovascular conditions   [x]Hypertension (I10)  []Hyperlipidemia (E78.5)  []Angina pectoris (I20)  []Atherosclerosis (I70)  []Pacemaker  []Hx of CABG/stent/  cardiac surgeries   Musculoskeletal conditions   []Disc pathology   []Congenital spine pathologies   []Osteoporosis (M81.8)  []Osteopenia (M85.8)  []Scoliosis   X osteomyelitis in spine 2019     Endocrine conditions   []Hypothyroid (E03.9)  []Hyperthyroid Gastrointestinal conditions   [x]Constipation (K59.00) - periodically   Metabolic conditions   []Morbid obesity (E66.01)  []Diabetes type 1(E10.65) or 2 (E11.65)   [x]Neuropathy (G60.9)     Cardio/Pulmonary conditions   []Asthma (J45)  []Coughing   []COPD (J44.9)  []CHF  []A-fib   Psychological Disorders  []Anxiety (F41.9)  []Depression (F32.9)   [x]Other: Cecily Altman been a worrier\"    Developmental Disorders  []Autism (F84.0)  []CP (G80)  []Down Syndrome (Q90.9)  []Developmental delay     Neurological conditions  []Prior Stroke (I69.30)  []Parkinson's (G20)  []Encephalopathy (G93.40)  []MS (G35)  []Post-polio (G14)  []SCI  []TBI  []ALS Other conditions  []Fibromyalgia (M79.7)  []Vertigo  []Syncope  []Kidney Failure  []Cancer      []currently undergoing                treatment  []Pregnancy  []Incontinence   Prior surgeries  []involved limb  []previous spinal surgery  [] section birth  []hysterectomy  [x]bowel / bladder surgery  Colonoscopy and reversal 1989 due to ruptured colon  []other relevant surgeries   [x]Other:   glaucoma - shots in L eye           Barriers to/and or personal factors that will affect rehab potential:              [x]Age  [x]Sex   []Smoker              []Motivation/Lack of Motivation                        [x]Co-Morbidities              []Cognitive Function, education/learning barriers              []Environmental, home barriers              []profession/work barriers  []past PT/medical experience  [x]other: initially skeptical about how therapy and exercises could be beneficial, but by end of therapy session seemed to have improved understanding of underlying contributions and how PF PT may be able to improve her sypmtoms  Justification: Patient demonstrates interest in and motivation for maximizing potential for improved PF muscle and bladder control.      Falls Risk Assessment (30 days):   [x] Falls Risk assessed and no intervention required. [] Falls Risk assessed and Patient requires intervention due to being higher risk   TUG score (>12s at risk):     [] Falls education provided, including         ASSESSMENT:    Patient presents to PF PT with complaints of urinating frequently with increased urgency for about the past year, urinary leakage when stands up/lifts/strains, and dribbling after urination. Started taking bladder control medications for about 11 days, not noticing much of a difference and does not like the side effects. Hopeful to discontinue bladder control medications long term - not a fan of medications in general and certainly not with the side effects she is experiencing. Patient has had several abdominal surgeries with abdominal scars visualized and high probability of scarring internally as well which may be effecting the ability of her bladder to properly expand. In addition, tightness in skin tissue from lichen sclerosis with additional tightness in the underlying PF muscles are liking contributing to patient current bladder symptoms. Patient also reports walking and/or biking 3 time/week for exercise, but does not stretch. Likely tightness in bilateral hips (L>R) with muscles attached to pelvis are also contributing factor to the overall tightness in the pelvic area. In addition, patient's lack of awareness with holding her breath and not using proper breathing patterns could be increasing her intra-abdominal/pelvis pressure leading to increased pressure on the bladder and thus increased urinary urgency, frequency and incontinence.    Severity of problem: 7-8/10  Patient's Goal: \"to have more control over when I go to the bathroom\"and to not have to take bladder control medications      After reviewing bowel/bladder behavioral modification information, PT used a pelvic floor model to orient the patient with her pelvic organ and pelvic floor muscles anatomy. Patient verbally consented to transvaginal pelvic muscle floor muscle assessment which revealed poor control/power of her pelvic floor muscles with inconsistent mind-body connection and limited coordination. Patient was able to demonstrate up to 1/5 muscle contraction for 1-2 seconds (10 seconds is normal), but did not encourage holding contractions of PF muscles due to noted tightness/tension. Noted significant core instability and incoordination. With transvaginal palpation to PF muscles, noted moderate to severe tightness and tenderness in PF muscles. Instructed patient in diaphragmatic breathing for PF relaxation and quick flicks for urinary urge suppression. Patient would definitely benefit from pelvic floor physical therapy for manual interventions, continued education on healthy bladder/bowel habits and adjustment of behavioral modifications as well as advanced activities to improve muscle control/ coordination and endurance of pelvic floor, core, and hip muscles to decrease urinary frequency, urgency and incontinence, and intermittent constipation.      Functional Impairments:    [x]Noted lumbar/proximal hip hypomobility  []Noted lumbosacral and/or generalized hypermobility  [x]Decreased core/proximal hip strength and neuromuscular control   []Decreased LE functional strength  [x]pelvic/sacral/spinal malalignment   [x]Increased pain with penetration  [x]Absent/poor control of PF contraction   [x]Absent/poor control of PF relaxation  [x]Decreased control of bladder  []Decreased control of bowel    Functional Activity Limitations (from functional questionnaire and intake)  [x]Reduced ability to maintain good posture and demonstrate good body mechanics with sitting, bending, and lifting  [x]Reduced ability to perform lifting, reaching, carrying tasks  [x]Reduced ability to control urine  []Reduced ability to control bowel movements   []Reduced ability to ambulate prolonged functional periods/distances/surfaces  [x]Reduced ability to squat   [x]Reduced ability to forward bend  []Reduced ability to ascend/descend stairs  [x]Reduced ability to tolerate penetration/intercourse - for medical examinations    Participation Restrictions  [x]Reduced participation in self care activities  [x]Reduced participation in home management activities  []Reduced participation in work activities  [x]Reduced participation in social activities   [x]Reduced participation in sport/recreational activities    Classification/Subgrouping:  []signs/symptoms consistent vaginismus/dyspareunia    []signs/symptoms consistent with pelvic floor organ prolapse  []signs/symptoms consistent with stress urinary incontinence  [x]signs/symptoms consistent with urge urinary incontinence  []signs/symptoms consistent with bowel incontinence  [x]signs/symptoms consistent with post-surgical status including decreased ROM, strength and function - adhesions from scarring with multiple abdominal surgeries as well as lichen sclerosis   []signs/symptoms consistent with other:       Prognosis/Rehab Potential:      []Excellent   [x]Good    []Fair   []Poor    Tolerance of evaluation/treatment:    []Excellent   [x]Good    []Fair   []Poor    Physical Therapy Evaluation Complexity Justification  [x] A history of present problem with:  [] no personal factors and/or comorbidities that impact the plan of care;  []1-2 personal factors and/or comorbidities that impact the plan of care  [x]3 personal factors and/or comorbidities that impact the plan of care  [x] An examination of body systems using standardized tests and measures addressing any of the following: body structures and functions (impairments), activity limitations, and/or participation restrictions;:  [] a total of 1-2 or more elements   [x] a total of 3 or more elements   [] a total of 4 or more elements   [x] A clinical presentation with:  [] stable and/or uncomplicated characteristics   [x] evolving clinical presentation with changing characteristics  [] unstable and unpredictable characteristics;   [x] Clinical decision making of [] low, [x] moderate, [] high complexity using standardized patient assessment instrument and/or measurable assessment of functional outcome. [] EVAL (LOW) 32134 (typically 20 minutes face-to-face)  [x] EVAL (MOD) 21194 (typically 30 minutes face-to-face)  [] EVAL (HIGH) 71071 (typically 45 minutes face-to-face)  [] RE-EVAL       PLAN:   Frequency/Duration:     Recommend see patient every ~1-2 weeks initially to perform tranvaginal PF muscle assessment and intervention as deemed necessary then to ensure patient is performing exercises for pelvic floor, hip and core stability correctly. Taper as appropriate, determining frequency of treatments based on progress, for a total of ~8-10 sessions. Next scheduled appointment is on 3/29 at 3PM.    Interventions:  [x]  Therapeutic exercise including: strength training, ROM, and functional mobility for joint, spine, core, and pelvic floor   [x]  NMR activation and proprioception for abdominals, pelvic floor musculature activation and coordination, and posture retraining   [x]  Manual therapy as indicated for spine, ribs, soft tissue, and pelvic floor to include: IASTM with or without dilator, STM, PROM, Gr I-IV mobilizations   [x] Modalities as needed that may include: E-stim, Biofeedback as indicated  [x] Patient education on pelvic floor anatomy and function, bladder and bowel anatomy and function, joint protection, postural re-education, activity modification, progression of HEP.        Treatment Interventions Implemented    Exercise/Neuromuscular Re-education - Written HEP instructions provided and reviewed    Diaphragmatic breathing - coordinating with pelvic floor muscle activities - tactile cues on stomach - cues for PF descent with inhalation and educated patient to AVOID holding PF contraction    PF muscle control exercises -  supine - transvaginal feedback - QUICK FLICKS - 10 reps with cues to use for urge suppression and may practice for exercise ~2 times/day    Manual Interventions -   Patient verbally consented to transvaginal PF muscle assessment and intervention. Superficial external myofascial release and massage to transverse perineal and external anal sphincter muscles. External clock - moderate to severe tightness and tenderness in transverse perineal and perineal body, L>R  Transvaginal myofascial release and stretching of superficial, middle, and deep layers of PF muscles with focus on areas of increased tension and tissue resistance. Internal clock             Superficial - moderate to severe tightness and tenderness at 4-6 o'clock with tight tissue bands noted              Middle/Deep - mild to moderate in R PF, moderate to severe in L PF but did improve slight with bimanual release  Bimanual myofascial release simultaneously with internal/transvaginal on PF muscles and external on buttocks/later thigh, inner thigh, and abdomen to determine changes in tissue tension with slacking and stretching of the fascia. Focus on L side due to increased tightness and tenderness compared to R. Patient Education -   Extensive education on anatomy of pelvis including PF muscles and pelvic organs. Emphasized need for stretching and down training of tight muscles and strengthening of weak muscles to promote improve muscle balance in pelvis/low back and legs. Used PF model to demonstrate transvaginal PF muscle assessment to which patient verbally consented. Patient appeared to have better understanding of current bladder/bowel issues and improved outlook on situation by end of PF PT therapy session. Issued and reviewed handouts on , contributing factors to PF dysfunction, normal bladder health/bladder irritants, diaphragmatic breathing, quick flicks for urge suppression.      GOALS:  Patient stated goal: Patient's Goal: \"to have more control over when I go to the bathroom\"and to not have to take bladder control medications  [] Progressing: [] Met: [] Not Met: [] Adjusted      Therapist goals for Patient:     Short Term Goals: To be achieved in: 4-5 sessions    1. Patient will have a decrease in urinary urgency, frequency, and incontinence to indicate improvement in pelvic floor strength and relaxation, muscle coordination, and/or bladder retraining. [] Progressing: [] Met: [] Not Met: [] Adjusted  2. Perform HEP for pelvic floor and core muscle groups with minimal direction from therapist as she progresses to become more independent managing her pelvic pressure and PF muscle weakness and/or tightness. [] Progressing: [] Met: [] Not Met: [] Adjusted  3. Report using 1-2 behavioral modification strategies to reduce urinary/bowel complaints through dietary and mechanical changes, with focus on full emptying of bladder with each urination and using optimal positioning and deep breathing for relaxation of posterior PF muscles during defacation, reducing need to strain. [] Progressing: [] Met: [] Not Met: [] Adjusted    Long Term Goals: To be achieved in: 8-10 sessions    1. Improve score of quality of life index measure, PFDI-20, to 80 or less (initial eval - 95.83) disability index to assist with reaching prior level of function and demonstrating improved quality of life with less life interruptions due to urinary urgency, frequency, and incontinence allowing patient to fully participate in socially appropriate activities, including bending and lifting for homemaking chores. [] Progressing: [] Met: [] Not Met: [] Adjusted  2. Patient will report using 1 or less pads for urinary protection to progress towards completing ADLs and recreational activities without leakage. (using about 3 pads/day at initial eval)  [] Progressing: [] Met: [] Not Met: [] Adjusted  3.  Patient will return to functional activities including bending/lifting and transferring sit to stand without increased symptoms/decrased urinary leakage. [] Progressing: [] Met: [] Not Met: [] Adjusted  4. Patient will be able to control her bladder with less urinary urgency, frequency and incontinence without the need for continued medication for bladder control issues. [] Progressing: [] Met: [] Not Met: [] Adjusted   5. Report using 3-4 behavioral modification strategies to reduce urinary/bowel complaints through dietary and mechanical changes, with focus on full emptying of bladder with each urination and using optimal positioning and deep breathing for relaxation of posterior PF muscles during defacation, reducing need to strain. [] Progressing: [] Met: [] Not Met: [] Adjusted   6. Rate severity of the problem related to urinary frequency/urgency/incontinence 3-4 or less on scale of 0-10 with 0 being no problem and 10 being the worst - (7-8/10 reported at intial [facility]). [] Progressing: [] Met: [] Not Met: [] Adjusted   7. Perform HEP for pelvic floor and core muscle groups independently as she progresses to self-manage her pelvic pressure and PF muscle weakness and/or tightness. [] Progressing: [] Met: [] Not Met: [] Adjusted     Electronically signed by:  Shabana Michael, PT #[id number]    Medicare Cap (if applicable):    $848.46 = total amount used, updated 3/22/2022    Time in: 915   Time Out:1115  Total Treatment Time: 120 minutes  Coded Treatment Time: 105 minutes    Note: If patient does not return for scheduled/recommended follow up visits, this note will serve as a discharge from care along with the most recent update on progress.

## 2022-03-22 ENCOUNTER — HOSPITAL ENCOUNTER (OUTPATIENT)
Dept: PHYSICAL THERAPY | Age: 87
Setting detail: THERAPIES SERIES
Discharge: HOME | End: 2022-03-22
Payer: MEDICARE

## 2022-03-22 PROCEDURE — 97110 THERAPEUTIC EXERCISES: CPT | Performed by: PHYSICAL THERAPIST

## 2022-03-22 PROCEDURE — 97162 PT EVAL MOD COMPLEX 30 MIN: CPT | Performed by: PHYSICAL THERAPIST

## 2022-03-22 PROCEDURE — 97530 THERAPEUTIC ACTIVITIES: CPT | Performed by: PHYSICAL THERAPIST

## 2022-03-22 PROCEDURE — 97140 MANUAL THERAPY 1/> REGIONS: CPT | Performed by: PHYSICAL THERAPIST

## 2022-03-24 NOTE — PROGRESS NOTES
[address] [facility] [address] [address]  Phone: [phone]  Fax: [phone]        Physical Therapy Daily Treatment Note    Date: 3/29/2022    Patient Name: Inez Deluna : 3/18/1932 MRN: [id number]    Restrictions/Precautions:    Medical/Treatment Diagnosis Information:    · Diagnosis: R35.0- Urinary frequency; R39.15 - Urinary urgency    Insurance/Certification information: PT Insurance Information: Medicare    Physician Information: Referring Practitioner: DENISE Linares    Plan of care signed (Y/N): Y  Cosigned by: IKER Linares CNP at 3/23/2022  9:04 AM       Visit# / total visits: 2/10    Medicare Cap (if applicable):    $476.39 = total amount used, updated 3/29/2022    Time in: 1500  Time Out: 1608  Total Treatment Time: 68 minutes    ________________________________________________________________________________________    Pain Level: denies    SUBJECTIVE:   Patient reports having a fall while taking out the recycle bin - last Wednesday night - had some bumps and bruises on knees and hand. Reviewed options to have family assist her and/or take alert button or mobile phone with her. Patient reports that rocking helps to more fully empty the bladder. Typically urinating every ~2-3 hours. Has been limiting fluids 2 hours prior to bedtime - went 7 hours through the night, did go back to bed in the morning. Performing quick flicks - not always certain how best to use them - reviewed to use with triggers or in anticipation of needing to delay urge. Continue to complain of urine leakage after sitting for awhile and as she stands up.      OBJECTIVE:    Treatment Interventions Implemented  Exercise/Neuromuscular Re-education - Written HEP instructions provided and reviewed     Diaphragmatic breathing - coordinating with pelvic floor muscle activities - tactile cues on stomach - cues for PF descent with inhalation and educated patient to AVOID holding PF contraction     PF muscle control exercises -  supine - transvaginal feedback - QUICK FLICKS - 10 reps with cues to use for urge suppression and may practice for exercise ~2 times/day  Added stretches below - LLE tends to be tighter than R with all stretches  Happy baby - hold for 1 minute = 10 slow, deep breaths s 2-3 reps OR 2-3 minutes total  Hip add stretch - supine - hold 8-10 breaths/~1 minute x 2-3 reps each LE OR 2-3 minutes total   Piriformis stretch - figure four supine and then sitting - hold 8-10 breaths/~1 minute x 2 reps each LE   Lateral thigh/piriformis stretch - one leg crossed over the other in supine -   hold for 1 minute = 10 slow, deep breaths s 2-3 reps      Manual Interventions -   Limited time so focus of manual interventions were on deeper, tight muscles of L PF. Patient verbally consented to transvaginal PF muscle assessment and intervention. Superficial external myofascial release and massage to transverse perineal and external anal sphincter muscles. External clock - moderate to severe tightness and tenderness in transverse perineal and perineal body, L>R  Transvaginal myofascial release and stretching of superficial, middle, and deep layers of PF muscles with focus on areas of increased tension and tissue resistance. Internal clock             Superficial - moderate to severe tightness and tenderness at 7-8 o'clock with tight tissue bands noted more on R than L - sweeping stretch to tight areas superficially             Middle/Deep - mild to moderate in R PF, moderate to severe in L PF but did improve slightly with bimanual release  Bimanual myofascial release simultaneously with internal/transvaginal on PF muscles and external on buttocks/later thigh, inner thigh, and abdomen to determine changes in tissue tension with slacking and stretching of the fascia.  Focus on L side due to increased tightness and tenderness compared to R.    Patient Education -   Extensive education on anatomy of pelvis including PF muscles and pelvic organs. Emphasized need for stretching and down training of tight muscles and strengthening of weak muscles to promote improve muscle balance in pelvis/low back and legs. Used PF model to demonstrate transvaginal PF muscle assessment to which patient verbally consented. Patient appeared to have better understanding of current bladder/bowel issues and improved outlook on situation by end of PF PT therapy session.  Issued and reviewed handouts on , contributing factors to PF dysfunction, normal bladder health/bladder irritants, diaphragmatic breathing, quick flicks for urge suppression.      Emphasized for stretching to counteract tightening and shortening due to functional mobility and exercise. Emphasized need for exhalation on exertion to improve intra-abdominal/pelvis pressures and lessen pressure on pelvic organs, specifically the bladder which can lead to urinary urgency, frequency, and incontinence. Spoke with daughter and patient about not overwhelming with stretches but to try to increase slowly able with unresolved knee pain from her recent fall. ASSESSMENT:  Patient reports moderate improvement in bladder control issues with implementation of several of the behavioral modifications suggested at the initial evaluation. However, patient continues to report urine leakage when standing from seated position, so educated in need to exhale upon exertion, including with sit to stand activities to improve management of intra-abdominal/pelvic pressure and thus less pressure on bladder. Patient continues with tightness in bilateral hips/buttocks with L>R for all stretches.   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 Patient would definitely benefit from pelvic floor physical therapy for manual interventions to improve mobility of tight tissues in and around PF muscles and bladder, continued education on healthy bladder/bowel habits and adjustment of behavioral modifications as well as advanced activities to improve muscle control/ coordination and endurance of pelvic floor, core, and hip muscles to decrease urinary frequency, urgency and incontinence, and intermittent constipation. Treatment/Activity Tolerance:    Patient tolerated treatment fairly well   Patient seemed overwhelmed with amount and variations of PF/hip/buttocks stretches for which she was instructed today. Patient and daughter informed that patient can gradually add these over next couple of weeks to allow patient to continue to heal from her injuries from her fall last week. Patient reports less tension in buttocks/hips at end of session after stretches and manual interventions, but reports increased discomfort/soreness in her knees which she injured with her fall last week. GOALS:  Patient stated goal: Patient's Goal: \"to have more control over when I go to the bathroom\"and to not have to take bladder control medications  []? Progressing: []? Met: []? Not Met: []? Adjusted        Therapist goals for Patient:      Short Term Goals: To be achieved in: 4-5 sessions     1. Patient will have a decrease in urinary urgency, frequency, and incontinence to indicate improvement in pelvic floor strength and relaxation, muscle coordination, and/or bladder retraining. [x]? Progressing: []? Met: []? Not Met: []? Adjusted  2. Perform HEP for pelvic floor and core muscle groups with minimal direction from therapist as she progresses to become more independent managing her pelvic pressure and PF muscle weakness and/or tightness. [x]? Progressing: []? Met: []? Not Met: []? Adjusted  3.  Report using 1-2 behavioral modification strategies to reduce urinary/bowel complaints through dietary and mechanical changes, with focus on full emptying of bladder with each urination and using optimal positioning and deep breathing for relaxation of posterior PF muscles during defacation, reducing need to strain. [x]? Progressing: []? Met: []? Not Met: []? Adjusted     Long Term Goals: To be achieved in: 8-10 sessions     1. Improve score of quality of life index measure, PFDI-20, to 80 or less (initial eval - 95.83) disability index to assist with reaching prior level of function and demonstrating improved quality of life with less life interruptions due to urinary urgency, frequency, and incontinence allowing patient to fully participate in socially appropriate activities, including bending and lifting for homemaking chores. []? Progressing: []? Met: []? Not Met: []? Adjusted  2. Patient will report using 1 or less pads for urinary protection to progress towards completing ADLs and recreational activities without leakage. (using about 3 pads/day at initial eval)  []? Progressing: []? Met: []? Not Met: []? Adjusted  3. Patient will return to functional activities including bending/lifting and transferring sit to stand without increased symptoms/decrased urinary leakage. []? Progressing: []? Met: []? Not Met: []? Adjusted  4. Patient will be able to control her bladder with less urinary urgency, frequency and incontinence without the need for continued medication for bladder control issues. []? Progressing: []? Met: []? Not Met: []? Adjusted            5. Report using 3-4 behavioral modification strategies to reduce urinary/bowel complaints through dietary and mechanical changes, with focus on full emptying of bladder with each urination and using optimal positioning and deep breathing for relaxation of posterior PF muscles during defacation, reducing need to strain. []? Progressing: []? Met: []? Not Met: []?  Adjusted 6. Rate severity of the problem related to urinary frequency/urgency/incontinence 3-4 or less on scale of 0-10 with 0 being no problem and 10 being the worst - (7-8/10 reported at [facility]). []? Progressing: []? Met: []? Not Met: []? Adjusted            7. Perform HEP for pelvic floor and core muscle groups independently as she progresses to self-manage her pelvic pressure and PF muscle weakness and/or tightness. []? Progressing: []? Met: []? Not Met: []? Adjusted                           Plan:   Continue per plan of care   Review stretching of hip/buttocks muscles - may add hip flexor. Manual interventions to address tight tissues in PF and surrounding structures. May attempt posterior buttocks/hip releases with patient in prone. Address core stabilization activities when functionally appropriate. Frequency/Duration:     Recommend see patient every ~1-2 weeks initially to perform tranvaginal PF muscle assessment and intervention as deemed necessary then to ensure patient is performing exercises for pelvic floor, hip and core stability correctly. Taper as appropriate, determining frequency of treatments based on progress, for a total of ~8-10 sessions. Patient's next scheduled appointment [facility] on 4/12 at [facility].  Electronically signed by Sierra Gregg PT on 3/29/2022 at 2:30 PM    Note: If patient does not return for scheduled/recommended follow up visits, this note will serve as a discharge from care along with the most recent update on progress.

## 2022-03-29 ENCOUNTER — HOSPITAL ENCOUNTER (OUTPATIENT)
Dept: PHYSICAL THERAPY | Age: 87
Setting detail: THERAPIES SERIES
Discharge: HOME | End: 2022-03-29
Payer: MEDICARE

## 2022-03-29 PROCEDURE — 97110 THERAPEUTIC EXERCISES: CPT | Performed by: PHYSICAL THERAPIST

## 2022-03-29 PROCEDURE — 97530 THERAPEUTIC ACTIVITIES: CPT | Performed by: PHYSICAL THERAPIST

## 2022-03-29 PROCEDURE — 97140 MANUAL THERAPY 1/> REGIONS: CPT | Performed by: PHYSICAL THERAPIST

## 2022-04-07 NOTE — PROGRESS NOTES
[address] [address]  Phone: [phone]  Fax: [phone]        Physical Therapy Daily Treatment Note    Date: 2022    Patient Name: Teresita Nelson : 3/18/1932 MRN: [id number]    Restrictions/Precautions:    Medical/Treatment Diagnosis Information:    · Diagnosis: R35.0- Urinary frequency; R39.15 - Urinary urgency    Insurance/Certification information: PT Insurance Information: Medicare    Physician Information: Referring Practitioner: DENISE Lowe    Plan of care signed (Y/N): Y  Cosigned by: IKER Lowe CNP at 3/23/2022  9:04 AM       Visit# / total visits: 3/10    Medicare Cap (if applicable):    $764.98 = total amount used, updated 2022    Time in: 1505  Time Out: 1610  Total Treatment Time: 65 minutes    Charges: Therapeutic Activity (12223)x2    Therapeutic Exercise (90739)x1    Manual (91800) - NA    Neuromuscular Re-ed (64166)x1      ________________________________________________________________________________________    Pain Level: denies    SUBJECTIVE:   Patient reports knees are improving since fall. Had fun celebrating her 89th birthday with family and friends. Patient reports that rocking still helps to more fully empty the bladder. Typically urinating every ~2-3 hours. Has been limiting fluids 2 hours prior to bedtime - went 7 hours through the night, did go back to bed in the morning. Patient reports holding urine too long while out to lunch and then running errands earlier today, so had an accident as she was coming into her home over 3 hours later. Performing quick flicks - not always certain how best to use them - reviewed to use with triggers or in anticipation of needing to delay urge. Continue to complain of urine leakage after sitting for awhile and as she stands up.      OBJECTIVE:    Treatment Interventions Implemented  Exercise/Neuromuscular Re-education - Written HEP instructions provided and reviewed     Diaphragmatic breathing - coordinating with pelvic floor muscle activities - tactile cues on stomach - cues for PF descent with inhalation and educated patient to AVOID holding PF contraction     PF muscle control exercises -  supine - transvaginal feedback - QUICK FLICKS - 10 reps with cues to use for urge suppression and may practice for exercise ~2 times/day    Per patient's request, reviewed stretches from last session to ensure proper technique and maximum benefit. Reviewed and made slight corrections to technique -   Happy baby - hold for 1 minute = 10 slow, deep breaths s 2-3 reps OR 2-3 minutes total  Hip add stretch - supine - hold 8-10 breaths/~1 minute x 2-3 reps each LE OR 2-3 minutes total   Piriformis stretch - figure four sitting - hold 8-10 breaths/~1 minute x 2 reps each LE - definitely prefers sitting stretch to supine since difficulty pulling thigh towards chest in supine  Lateral thigh/piriformis stretch - one leg crossed over the other in supine -   hold for 1 minute = 10 slow, deep breaths s 2-3 reps    Added  Posterior pelvic tilts - hold for 5-10 seconds x 10 reps - tactile and verbal cues to activate TA and to avoid clenching of PF muscles  Core stabilization activities - adding alternating shoulder flex, alternating hip flex, the alternating opposite hip/shoulder flex x 10 reps while maintaining TA contraction with ppt. Functional Mobility with increased awareness of decreasing demand on intraabdominal pressure -   Practiced sit to stand transfer with exhalation x 5 reps - intermittent urinary leakage  After initiating core stabilization with TA contraction, practiced sit to stand x 2 with TA contraction in preparation for sit to stand and then exhale during movement with no urinary leakage reported.        Manual Interventions -   Deferred to review stretches instructed in last session and to initiate core stabilization activities. Manual interventions below last performed on 3/29/22-        Patient Education -   Extensive education on anatomy of pelvis including PF muscles and pelvic organs. Emphasized need for stretching and down training of tight muscles and strengthening of weak muscles to promote improve muscle balance in pelvis/low back and legs. Used PF model to demonstrate transvaginal PF muscle assessment to which patient verbally consented. Patient appeared to have better understanding of current bladder/bowel issues and improved outlook on situation by end of PF PT therapy session.  Issued and reviewed handouts on , contributing factors to PF dysfunction, normal bladder health/bladder irritants, diaphragmatic breathing, quick flicks for urge suppression.      Emphasized for stretching to counteract tightening and shortening due to functional mobility and exercise. Emphasized need for exhalation on exertion to improve intra-abdominal/pelvis pressures and lessen pressure on pelvic organs, specifically the bladder which can lead to urinary urgency, frequency, and incontinence. Spoke with daughter and patient about not overwhelming with stretches but to try to increase slowly able with unresolved knee pain from her recent fall. Reviewed benefits and mechanism of action of bladder control medication - patient seems most dissatisfied as she does not believe they are helping her and does not appreciate the side effects. Has appointment with urogyn on 4/14 so suggested discussing at that appointment. Reviewed benefits of several behavioral modifications that patient has made and slight improvements noted even though patient verbally states that she is really not much better.    Extensive education and practice exhaling with sit to stand transfers and then after address core stabilization with TA contraction, adding TA contraction in preparation for transfer with exhalation during activity and patient reported no urine leakage with combination, although reported intermittent urinary leakage with exhalation alone. ASSESSMENT:  Patient continues to reports modest improvement in bladder control issues with implementation of several of the behavioral modifications suggested previously. However, patient continues to report urine leakage when standing from seated position and with getting out of bed, so reviewed optimal techniques with need to exhale upon exertion and performing TA contraction in preparation for movement to improve management of intra-abdominal/pelvic pressure and thus placing less pressure on bladder. With exhaling alone, patient still experienced some intermittent urinary leakage with sit to stand, but seemed to improve with TA contraction prior to initiating sit to stand and then exhaling during movement. Patient seemed to benefit from initiating core stabilization activities in isolation while supine and then with functional activities including rolling/sitting up from supine and sit to stand. Will continue to reinforce in future sessions as focus on control of intraabdominal pressure during functional activities which patient report are causing her urine leakage. Patient would definitely benefit from pelvic floor physical therapy for manual interventions to improve mobility of tight tissues in and around PF muscles and bladder, continued education on healthy bladder/bowel habits and adjustment of behavioral modifications as well as advanced activities to improve muscle control/ coordination and endurance of pelvic floor, core, and hip muscles to decrease urinary frequency, urgency and incontinence, and intermittent constipation. Treatment/Activity Tolerance:    Patient tolerated treatment fairly well   Patient appreciated review of stretches that seemed overwhelming last session.    Patient responded well to initiation of core stabilization techniques and initial attempts to incorporate these into her functional activities to decrease urinary incontinence by better managing her intraabdominal pressure. GOALS:  Patient stated goal: Patient's Goal: \"to have more control over when I go to the bathroom\"and to not have to take bladder control medications  []? Progressing: []? Met: []? Not Met: []? Adjusted        Therapist goals for Patient:      Short Term Goals: To be achieved in: 4-5 sessions     1. Patient will have a decrease in urinary urgency, frequency, and incontinence to indicate improvement in pelvic floor strength and relaxation, muscle coordination, and/or bladder retraining. [x]? Progressing: []? Met: []? Not Met: []? Adjusted  2. Perform HEP for pelvic floor and core muscle groups with minimal direction from therapist as she progresses to become more independent managing her pelvic pressure and PF muscle weakness and/or tightness. [x]? Progressing: []? Met: []? Not Met: []? Adjusted  3. Report using 1-2 behavioral modification strategies to reduce urinary/bowel complaints through dietary and mechanical changes, with focus on full emptying of bladder with each urination and using optimal positioning and deep breathing for relaxation of posterior PF muscles during defacation, reducing need to strain. [x]? Progressing: []? Met: []? Not Met: []? Adjusted     Long Term Goals: To be achieved in: 8-10 sessions     1. Improve score of quality of life index measure, PFDI-20, to 80 or less (initial eval - 95.83) disability index to assist with reaching prior level of function and demonstrating improved quality of life with less life interruptions due to urinary urgency, frequency, and incontinence allowing patient to fully participate in socially appropriate activities, including bending and lifting for homemaking chores. []? Progressing: []? Met: []? Not Met: []? Adjusted  2.  Patient will report using 1 or less pads for urinary protection to progress towards completing ADLs and recreational activities without leakage. (using about 3 pads/day at initial eval)  []? Progressing: []? Met: []? Not Met: []? Adjusted  3. Patient will return to functional activities including bending/lifting and transferring sit to stand without increased symptoms/decrased urinary leakage. []? Progressing: []? Met: []? Not Met: []? Adjusted  4. Patient will be able to control her bladder with less urinary urgency, frequency and incontinence without the need for continued medication for bladder control issues. []? Progressing: []? Met: []? Not Met: []? Adjusted            5. Report using 3-4 behavioral modification strategies to reduce urinary/bowel complaints through dietary and mechanical changes, with focus on full emptying of bladder with each urination and using optimal positioning and deep breathing for relaxation of posterior PF muscles during defacation, reducing need to strain. []? Progressing: []? Met: []? Not Met: []? Adjusted            6. Rate severity of the problem related to urinary frequency/urgency/incontinence 3-4 or less on scale of 0-10 with 0 being no problem and 10 being the worst - (7-8/10 reported at intial eval). []? Progressing: []? Met: []? Not Met: []? Adjusted            7. Perform HEP for pelvic floor and core muscle groups independently as she progresses to self-manage her pelvic pressure and PF muscle weakness and/or tightness. []? Progressing: []? Met: []? Not Met: []? Adjusted                           Plan:   Continue per plan of care   As needed review any stretches of hip/buttocks muscles - may add hip flexor. Manual interventions to address tight tissues in PF and surrounding structures. May attempt posterior buttocks/hip releases with patient in prone. Advance core stabilization activities incorporating functional mobility as appropriate.        Frequency/Duration:     Patient is making progress toward goals but would continue to benefit from manual therapy to address tight tissues and progressing modification of movements and management of intraabdominal pressures with functional movements. Patient's next scheduled appointment is on 4/27 at [facility].  Electronically signed by Erick Garcia, PT #[id number] on 4/12/2022 at 5:43 PM    Note: If patient does not return for scheduled/recommended follow up visits, this note will serve as a discharge from care along with the most recent update on progress.

## 2022-04-12 ENCOUNTER — HOSPITAL ENCOUNTER (OUTPATIENT)
Dept: PHYSICAL THERAPY | Age: 87
Setting detail: THERAPIES SERIES
Discharge: HOME | End: 2022-04-12
Payer: MEDICARE

## 2022-04-12 PROCEDURE — 97530 THERAPEUTIC ACTIVITIES: CPT | Performed by: PHYSICAL THERAPIST

## 2022-04-12 PROCEDURE — 97110 THERAPEUTIC EXERCISES: CPT | Performed by: PHYSICAL THERAPIST

## 2022-04-12 PROCEDURE — 97112 NEUROMUSCULAR REEDUCATION: CPT | Performed by: PHYSICAL THERAPIST

## 2022-04-14 ENCOUNTER — OFFICE VISIT (OUTPATIENT)
Dept: UROGYNECOLOGY | Age: 87
End: 2022-04-14
Payer: MEDICARE

## 2022-04-14 VITALS
OXYGEN SATURATION: 98 % | TEMPERATURE: 98 F | RESPIRATION RATE: 14 BRPM | SYSTOLIC BLOOD PRESSURE: 162 MMHG | DIASTOLIC BLOOD PRESSURE: 82 MMHG | HEART RATE: 75 BPM

## 2022-04-14 DIAGNOSIS — L90.0 LICHEN SCLEROSUS ET ATROPHICUS: ICD-10-CM

## 2022-04-14 DIAGNOSIS — R35.0 URINARY FREQUENCY: Primary | ICD-10-CM

## 2022-04-14 DIAGNOSIS — R39.15 URINARY URGENCY: ICD-10-CM

## 2022-04-14 DIAGNOSIS — N39.41 URGE INCONTINENCE: ICD-10-CM

## 2022-04-14 PROCEDURE — 0509F URINE INCON PLAN DOCD: CPT | Performed by: NURSE PRACTITIONER

## 2022-04-14 PROCEDURE — 1123F ACP DISCUSS/DSCN MKR DOCD: CPT | Performed by: NURSE PRACTITIONER

## 2022-04-14 PROCEDURE — 4040F PNEUMOC VAC/ADMIN/RCVD: CPT | Performed by: NURSE PRACTITIONER

## 2022-04-14 PROCEDURE — 1090F PRES/ABSN URINE INCON ASSESS: CPT | Performed by: NURSE PRACTITIONER

## 2022-04-14 PROCEDURE — G8420 CALC BMI NORM PARAMETERS: HCPCS | Performed by: NURSE PRACTITIONER

## 2022-04-14 PROCEDURE — 99212 OFFICE O/P EST SF 10 MIN: CPT | Performed by: NURSE PRACTITIONER

## 2022-04-14 PROCEDURE — 1036F TOBACCO NON-USER: CPT | Performed by: NURSE PRACTITIONER

## 2022-04-14 PROCEDURE — G8427 DOCREV CUR MEDS BY ELIG CLIN: HCPCS | Performed by: NURSE PRACTITIONER

## 2022-04-14 NOTE — PROGRESS NOTES
2022       HPI:     Name: Bernadine Hopper  YOB: 1932    CC: Patient is a 80 y.o. presenting for evaluation of urge incontinence . HPI: How long have you had this problem? years  Please rate the severity of your problem: moderate  Anything make it better? Going to PFPT and taking Myrbetriq, see's slight improvement but says she is experiencing dry mouth. Feels PFPT is more helpful than Myrbetriq    Denies issues with her lichen. Ob/Gyn History:    OB History    Para Term  AB Living   6 4 4   2 4   SAB IAB Ectopic Molar Multiple Live Births   2         4      # Outcome Date GA Lbr Nick/2nd Weight Sex Delivery Anes PTL Lv   6 Term      Vag-Spont   DALE   5 Term      Vag-Spont   DALE   4 Term      Vag-Spont   DALE   3 SAB         FD   2 SAB         FD   1 Term      Vag-Spont   DALE     Past Medical History:   Past Medical History:   Diagnosis Date    Constipation     OCCAS    Frequent urination 02/15/2022    Frequent urination 2022    Glaucoma     Loss of bladder control 2022    Thyroid disease     NO MEDS-BORDERLINE    Urinary leakage      Past Surgical History:   Past Surgical History:   Procedure Laterality Date    ANTERIOR CRUCIATE LIGAMENT REPAIR Right     APPENDECTOMY      WITH COLON SURGERY    CHOLECYSTECTOMY      COLON SURGERY      COLON RUPTURE WITH COLOSTOMY    COLONOSCOPY      SEVERAL    EYE SURGERY      cataract    OTHER SURGICAL HISTORY      COLOSTOMY REVERSAL    OTHER SURGICAL HISTORY      HEMTOMA-ABOVE FOREHEAD AFTER FALL SURGERY-I&D    VULVAR/PERINEAL BIOPSY N/A 2022    VULVAR BIOPSY AND SEPARATION OF VULVA, CYSTOSCOPY performed by Rosalinda Chowdhury MD at [address] [place] [address]:    Allergies   Allergen Reactions    Codeine Nausea Only and Nausea And Vomiting     \"feels like I'm going to pass out\"    Morphine And Related Nausea And Vomiting and Other (See Comments)     \"passing out\"       Current Medications:  Current Outpatient Medications   Medication Sig Dispense Refill    clobetasol (TEMOVATE) 0.05 % ointment APPLY VAGINALLY TWICE DAILY FOR 5 WEEKS, THEN EVERY OTHER DAY FOR 2 WEEKS, THEN JUST 1 TO 2 TIMES WEEKLY FOR MAINTENANCE 45 g 2    mirabegron (MYRBETRIQ) 25 MG TB24 Take 1 tablet by mouth daily 30 tablet 1    clobetasol (TEMOVATE) 0.05 % cream APPLY VAGINALLY TWICE DAILY FOR 5 WEEKS, THEN EVERY OTHER DAY FOR 2 WEEKS, THEN JUST 1 TO 2 TIMES WEEKLY FOR MAINTENANCE      clobetasol prop emollient base 0.05 % CREA Apply vaginally twice daily for 5 weeks, then every other day for 2 weeks, then just 1 to 2 times weekly for maintenance.  45 g 2    Biotin 1000 MCG TABS Take 1 tablet by mouth daily      Cholecalciferol (VITAMIN D3) 125 MCG (5000 UT) TABS Take 1 tablet by mouth daily      Krill Oil (OMEGA-3) 500 MG CAPS Take 1 tablet by mouth daily 4 IN 1      Multiple Vitamins-Minerals (CENTRUM SILVER PO) Take 1 tablet by mouth daily      senna-docusate (PERICOLACE) 8.6-50 MG per tablet Take 1 tablet by mouth daily as needed      psyllium (METAMUCIL) 58.6 % packet Take 1 packet by mouth as needed       ROCKLATAN 0.02-0.005 % SOLN Take 1 drop by mouth at bedtime       UNABLE TO FIND Take 1 tablet by mouth daily Osteo biflex -TRIPLE STRENGTH      aspirin 81 MG chewable tablet Take 81 mg by mouth nightly       Ascorbic Acid (VITAMIN C) 1000 MG tablet Take 1,000 mg by mouth daily       pyridoxine (B-6) 100 MG tablet Take 100 mg by mouth daily      latanoprost (XALATAN) 0.005 % ophthalmic solution Place 1 drop into the right eye nightly       cyanocobalamin 1000 MCG tablet Take 1,000 mcg by mouth daily      dorzolamide-timolol (COSOPT) 22.3-6.8 MG/ML ophthalmic solution Place 1 drop into both eyes 2 times daily       pilocarpine (PILOCAR) 2 % ophthalmic solution Place 1 drop into the right eye 2 times daily       Multiple Vitamins-Minerals (VITEYES AREDS FORMULA) CAPS Take 1 capsule by mouth daily        No current facility-administered medications for this visit. Social History:   Social History     Socioeconomic History    Marital status:      Spouse name: Not on file    Number of children: Not on file    Years of education: Not on file    Highest education level: Not on file   Occupational History    Not on file   Tobacco Use    Smoking status: Never Smoker    Smokeless tobacco: Never Used   Vaping Use    Vaping Use: Never used   Substance and Sexual Activity    Alcohol use: Yes     Comment: occassionally    Drug use: Never    Sexual activity: Not Currently   Other Topics Concern    Not on file   Social History Narrative    Not on file     Social Determinants of Health     Financial Resource Strain:     Difficulty of Paying Living Expenses: Not on file   Food Insecurity:     Worried About Running Out of Food in the Last Year: Not on file    Sami of Food in the Last Year: Not on file   Transportation Needs:     Lack of Transportation (Medical): Not on file    Lack of Transportation (Non-Medical):  Not on file   Physical Activity:     Days of Exercise per Week: Not on file    Minutes of Exercise per Session: Not on file   Stress:     Feeling of Stress : Not on file   Social Connections:     Frequency of Communication with Friends and Family: Not on file    Frequency of Social Gatherings with Friends and Family: Not on file    Attends Tenriism Services: Not on file    Active Member of [address] [place] [address] [employer] or Organizations: Not on file    Attends Club or Organization Meetings: Not on file    Marital Status: Not on file   Intimate Partner Violence:     Fear of Current or Ex-Partner: Not on file    Emotionally Abused: Not on file    Physically Abused: Not on file    Sexually Abused: Not on file   Housing Stability:     Unable to Pay for Housing in the Last Year: Not on file    Number of [place] in the Last Year: Not on file    Unstable Housing in the Last Year: Not on file     Family History: Family History   Problem Relation Age of Onset    Lung Cancer Brother     Heart Attack Brother     Diabetes Brother     Stroke Brother     Emphysema Father     Cancer Brother     Lung Cancer Brother      Review of System   Review of Systems   Genitourinary: Positive for frequency. All other systems reviewed and are negative. A review of systems was done by the patient and reviewed by me. Objective:     Vitals  Vitals:    04/14/22 0842   BP: (!) 162/82   Pulse: 75   Resp: 14   Temp: 98 °F (36.7 °C)   SpO2: 98%     Physical Exam  Physical Exam  Vitals and nursing note reviewed. Constitutional:       Appearance: Normal appearance. HENT:      Head: Normocephalic. Eyes:      Conjunctiva/sclera: Conjunctivae normal.   Cardiovascular:      Rate and Rhythm: Normal rate. Pulmonary:      Effort: Pulmonary effort is normal.   Musculoskeletal:         General: Normal range of motion. Cervical back: Normal range of motion. Skin:     General: Skin is warm and dry. Neurological:      General: No focal deficit present. Mental Status: She is alert. Psychiatric:         Mood and Affect: Mood normal.         Behavior: Behavior normal.         No results found for this visit on 04/14/22. Assessment/Plan:     Emely Edwards is a 80 y.o. female with   1. Urinary frequency    2. Urinary urgency    3. Urge incontinence    4. Lichen sclerosus et atrophicus      -Records reviewed    She has been on Myrbetriq now for 30 days and has had 2 PFPT visits. She feels PFPT is more helpful than Myrbetriq. Her BP is stable and her family has been checking it as well    I encouraged her to continue with PFPT and Myrbetriq at this time and we agreed she can stop the Myrbetriq at end of her 60 day supply. She really does not desire to be on medication. She is pleased with her PT visits. She will follow up in 3 months to assess progress.   Gene Vidal, APRN - CNP    No orders of the defined types were placed in this encounter. No orders of the defined types were placed in this encounter.       Harden Apgar, APRN - CNP

## 2022-04-21 NOTE — PROGRESS NOTES
[facility] [address]  Phone: [phone]  Fax: [phone]        Physical Therapy Daily Treatment Note    Date: 2022    Patient Name: Lizet Malik : 3/18/1932 MRN: [id number]    Restrictions/Precautions:    Medical/Treatment Diagnosis Information:    · Diagnosis: R35.0- Urinary frequency; R39.15 - Urinary urgency    Insurance/Certification information: PT Insurance Information: Medicare    Physician Information:   IKER Perez CNP      Plan of care signed (Y/N): Y  Cosigned by: IKER Perez CNP at 3/23/2022  9:04 AM       Visit# / total visits: 4/10    Medicare Cap (if applicable):    $906.03 = total amount used, updated 2022    Time in: 1500  Time Out: 1600  Total Treatment Time: 60 minutes    Charges: Therapeutic Activity (89056)x2    Therapeutic Exercise (61091)x1    Manual (14693) - NA    Neuromuscular Re-ed (98079)x1      ________________________________________________________________________________________    Pain Level: denies    SUBJECTIVE:   Patient reports knees are feeling better since her fall, R worse than L due to previous surgery for meniscus surgery. 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 Patient reports the her urination issues depend on what she is drinking. Patient continues to report that rocking still helps to more fully empty the bladder. Typically urinating every ~2-3 hours. Has been limiting fluids 2 hours prior to bedtime - went as long as 7 hours through the night, sometimes goes back to bed in the morning. Patient reports typically getting once at night to urinate. Patient reports generally no urinary accidents, but did have one episode when talking on the phone for an extended period of time and leaked small amount of urine with standing up - doesn't always remember to exhale with exertion.    Rarely performing quick 2-3 seconds, ensure TA contraction and exhale with pushing to standing upright  Single leg stance with occasional UE support for ~ 10 second x 2 each LE- core stabilization more imbalanced on R foot due to decrease sensation    Manual Interventions -   Deferred to review stretches instructed in last session and to initiate core stabilization activities. Patient prefers to work on external activities as opposed to transvaginal manual interventions. Manual interventions below last performed on 3/29/22-        Patient Education -   Extensive education on anatomy of pelvis including PF muscles and pelvic organs. Emphasized need for stretching and down training of tight muscles and strengthening of weak muscles to promote improve muscle balance in pelvis/low back and legs. Used PF model to demonstrate transvaginal PF muscle assessment to which patient verbally consented. Patient appeared to have better understanding of current bladder/bowel issues and improved outlook on situation by end of PF PT therapy session.  Issued and reviewed handouts on , contributing factors to PF dysfunction, normal bladder health/bladder irritants, diaphragmatic breathing, quick flicks for urge suppression.      Reviewed stretching to counteract tightening and shortening due to functional mobility and exercise. Reviewed need for exhalation on exertion to improve intra-abdominal/pelvis pressures and lessen pressure on pelvic organs, specifically the bladder which can lead to urinary urgency, frequency, and incontinence. Spoke with daughter and patient about not overwhelming with stretches but to try to increase slowly able with unresolved knee pain from her recent fall. Reviewed benefits and mechanism of action of bladder control medication - patient seems most dissatisfied as she does not believe they are helping her and does not appreciate the side effects.   Had appointment with urogyn on 4/14 so suggested discussing at that appointment. Patient reports that she needs to finish out her current medications and can discuss discontinuing them at her next follow up appoint in July 2022. Reviewed education and practiced exhaling with sit to stand transfers and then after address core stabilization with TA contraction, adding TA contraction in preparation for transfer with exhalation during activity and patient reported no urine leakage with combination, although reported intermittent urinary leakage with exhalation alone. Issued and reviewed booklet on body mechanics, emphasizing need for TA contraction prior to and during lifting activities and exhalation on exertion with all functional activities and exercises. ASSESSMENT:  Patient continues to reports modest improvement in bladder control issues with implementation of several of the behavioral modifications suggested previously. However, patient continues to report occasional urine leakage when standing from seated position after prolonged sitting, so reviewed optimal techniques with need to exhale upon exertion and performing TA contraction in preparation for movement to improve management of intra-abdominal/pelvic pressure and thus placing less pressure on bladder. With exhaling alone, patient still experienced some intermittent urinary leakage with sit to stand, but seemed to improve with TA contraction prior to initiating sit to stand and then exhaling during movement. Patient required cues to perform TA contraction prior to and in preparation for functional movement as this would be the optimal timing and coordination to limit bladder leakage. Patient seemed to benefit from initiating core stabilization activities in isolation while supine and then with functional activities including rolling/sitting up from supine and sit to stand.  Will continue to reinforce in future sessions as focus on control of intraabdominal pressure during functional activities which patient report are causing her urine leakage. Able to advance to standing wall squats with focus on TA contraction and exhale when returning to upright stance. Educated in body mechanics with functional application of TA contraction and exhalation on exertion with functional activities. Patient would definitely benefit from pelvic floor physical therapy for manual interventions to improve mobility of tight tissues in and around PF muscles and bladder, continued education on healthy bladder/bowel habits and adjustment of behavioral modifications as well as advanced activities to improve muscle control/ coordination and endurance of pelvic floor, core, and hip muscles to decrease urinary frequency, urgency and incontinence, and intermittent constipation. Treatment/Activity Tolerance:    Patient tolerated treatment fairly well   Patient appreciated review of core stabilization activities - needed reinforcement of improved coordination with neuromuscular re-education with initiating with TA for core stabilization and more automatic contraction of PF muscles. Patient responded well to initiation of core stabilization techniques and initial attempts to incorporate these into her functional activities to decrease urinary incontinence by better managing her intraabdominal pressure. GOALS:  Patient stated goal: Patient's Goal: \"to have more control over when I go to the bathroom\"and to not have to take bladder control medications  []? Progressing: []? Met: []? Not Met: []? Adjusted        Therapist goals for Patient:      Short Term Goals: To be achieved in: 4-5 sessions     1. Patient will have a decrease in urinary urgency, frequency, and incontinence to indicate improvement in pelvic floor strength and relaxation, muscle coordination, and/or bladder retraining. [x]? Progressing: []? Met: []? Not Met: []? Adjusted  2.  Perform HEP for pelvic floor and core muscle groups with minimal direction from therapist as she progresses to become more independent managing her pelvic pressure and PF muscle weakness and/or tightness. [x]? Progressing: []? Met: []? Not Met: []? Adjusted  3. Report using 1-2 behavioral modification strategies to reduce urinary/bowel complaints through dietary and mechanical changes, with focus on full emptying of bladder with each urination and using optimal positioning and deep breathing for relaxation of posterior PF muscles during defacation, reducing need to strain. [x]? Progressing: []? Met: []? Not Met: []? Adjusted     Long Term Goals: To be achieved in: 8-10 sessions     1. Improve score of quality of life index measure, PFDI-20, to 80 or less (initial eval - 95.83) disability index to assist with reaching prior level of function and demonstrating improved quality of life with less life interruptions due to urinary urgency, frequency, and incontinence allowing patient to fully participate in socially appropriate activities, including bending and lifting for homemaking chores. []? Progressing: []? Met: []? Not Met: []? Adjusted  2. Patient will report using 1 or less pads for urinary protection to progress towards completing ADLs and recreational activities without leakage. (using about 3 pads/day at initial eval)  []? Progressing: []? Met: []? Not Met: []? Adjusted  3. Patient will return to functional activities including bending/lifting and transferring sit to stand without increased symptoms/decrased urinary leakage. []? Progressing: []? Met: []? Not Met: []? Adjusted  4. Patient will be able to control her bladder with less urinary urgency, frequency and incontinence without the need for continued medication for bladder control issues. []? Progressing: []? Met: []? Not Met: []? Adjusted            5.  Report using 3-4 behavioral modification strategies to reduce urinary/bowel complaints through dietary and mechanical changes, with focus on full emptying of bladder with each urination and using optimal positioning and deep breathing for relaxation of posterior PF muscles during defacation, reducing need to strain. []? Progressing: []? Met: []? Not Met: []? Adjusted            6. Rate severity of the problem related to urinary frequency/urgency/incontinence 3-4 or less on scale of 0-10 with 0 being no problem and 10 being the worst - (7-8/10 reported at [facility]). []? Progressing: []? Met: []? Not Met: []? Adjusted            7. Perform HEP for pelvic floor and core muscle groups independently as she progresses to self-manage her pelvic pressure and PF muscle weakness and/or tightness. []? Progressing: []? Met: []? Not Met: []? Adjusted                           Plan:   Continue per plan of care   As needed review any stretches of hip/buttocks muscles - may add hip flexor. If consents at future appointments, may attempt manual assessment interventions to address tight tissues in PF and surrounding structures or at least to confirm that patient is correctly марина her PF muscle during functional activities. May attempt posterior buttocks/hip releases with patient in prone, possibly through clothing if patient prefers. Advance core stabilization activities incorporating functional mobility as appropriate. Frequency/Duration:     Patient is making progress toward goals but would continue to benefit from manual therapy to address tight tissues and progressing modification of movements and management of intraabdominal pressures with functional movements. Patient's next scheduled appointment is on 5/10 at [place] [id number].  Electronically signed by Ann Marie Childress, PT #[id number] on 4/27/2022 at 6:45 PM    Note: If patient does not return for scheduled/recommended follow up visits, this note will serve as a discharge from care along with the most recent update on progress.

## 2022-04-27 ENCOUNTER — HOSPITAL ENCOUNTER (OUTPATIENT)
Dept: PHYSICAL THERAPY | Age: 87
Setting detail: THERAPIES SERIES
Discharge: HOME | End: 2022-04-27
Payer: MEDICARE

## 2022-04-27 PROCEDURE — 97530 THERAPEUTIC ACTIVITIES: CPT | Performed by: PHYSICAL THERAPIST

## 2022-04-27 PROCEDURE — 97110 THERAPEUTIC EXERCISES: CPT | Performed by: PHYSICAL THERAPIST

## 2022-04-27 PROCEDURE — 97112 NEUROMUSCULAR REEDUCATION: CPT | Performed by: PHYSICAL THERAPIST

## 2022-05-03 NOTE — PROGRESS NOTES
[facility] [address]  Phone: [phone]  Fax: [phone]        Physical Therapy Daily Treatment Note    Date: 5/10/2022    Patient Name: Espinoza Martel : 3/18/1932 MRN: [id number]    Restrictions/Precautions:    Medical/Treatment Diagnosis Information:    · Diagnosis: R35.0- Urinary frequency; R39.15 - Urinary urgency    Insurance/Certification information: PT Insurance Information: Medicare    Physician Information:   IKER Neely CNP      Plan of care signed (Y/N): Y  Cosigned by: IKER Neely CNP at 3/23/2022  9:04 AM       Visit# / total visits: 5/10    Medicare Cap (if applicable):    $391.52 = total amount used, updated 5/10/2022    Time in: 1005  Time Out: 1125  Total Treatment Time: 80 minutes    Charges: Therapeutic Activity (43938)x2    Therapeutic Exercise (53262)x1    Manual (01248) - x1    Neuromuscular Re-ed (96856)x1      ________________________________________________________________________________________    Pain Level: denies    SUBJECTIVE:   Patient drove herself to therapy today. Patient reports teeth sensitivity - going to dentist on Thursday. Patient reports knees are feeling \"okay\", R worse than L due to previous surgery for meniscus surgery. 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 Patient reports generally getting up only once a night - trying to limit fluids in the evening. Patient continues to report that rocking still helps to more fully empty the bladder. Typically urinating every ~2-3 hours. Patient continues to be frustrated with the expense of her bladder medication - \"just don't like to take pills\". Reports throat is dry which she contributes to her bladder medication as well. Would like to not have to take bladder medication eventually, but agreed would continue until July per Madelin Jackson at Dr. Coelho [facility]ico office. Patient reports no urinary accidents, but does complain of urinary urgency solange when standing up after talking on the phone for an extended period of time or bending over to pick things up. OBJECTIVE:    Treatment Interventions Implemented  Exercise/Neuromuscular Re-education - Written HEP instructions provided and reviewed     Diaphragmatic breathing - coordinating with pelvic floor muscle activities - tactile cues on stomach - cues for PF descent with inhalation and educated patient to AVOID holding PF contraction     PF muscle control exercises -  supine - transvaginal feedback - QUICK FLICKS - 10 reps with cues to use for urge suppression and may practice for exercise ~2 times/day    Patient reports performing fairly regularly at home and without questions or concerns regarding following -   Happy baby - hold for 1 minute = 10 slow, deep breaths s 2-3 reps OR 2-3 minutes total  Hip add stretch - supine - hold 8-10 breaths/~1 minute x 2-3 reps each LE OR 2-3 minutes total   Piriformis stretch - figure four sitting - hold 8-10 breaths/~1 minute x 2 reps each LE - definitely prefers sitting stretch to supine since difficulty pulling thigh towards chest in supine - reviewed - cues for technique - continues to be tighter on R compared to L  Lateral thigh/piriformis stretch - one leg crossed over the other in supine -   hold for 1 minute = 10 slow, deep breaths s 2-3 reps    Posterior pelvic tilts - hold for 5-10 seconds x 10 reps - tactile and verbal cues to activate TA and to avoid clenching of PF muscles  Core stabilization activities - adding alternating shoulder flex, alternating hip flex, the alternating opposite hip/shoulder flex only x 10 reps while maintaining TA contraction with ppt.      Functional Mobility with increased awareness of decreasing demand on intraabdominal pressure -   Reviewed -   After initiating core stabilization with TA contraction, practiced sit to stand x 2 with TA contraction in preparation for sit to stand and then exhale during movement with no urinary leakage reported. Wall squats - hold 2-3 seconds, ensure TA contraction and exhale with pushing to standing upright  Single leg stance with occasional UE support for ~ 10 second x 2 each LE- core stabilization more imbalanced on R foot due to decrease sensation  Added-  Hip add sets - seated - squeezing into fisted hands placed between knees x 10 reps, hold for 5 seconds each - overflow for gentle/automatic contraction of PF muscles    Manual Interventions -   Reviewed stretches and PF automatic/overflow activation with core stabilization activities. Patient educated on benefits of manual interventions toward end of session and she reports that she is not opposed to suggested manual interventions if they with ultimately help with symptom management/resolution. Manual interventions below last performed on 3/29/22-   Through clothing per patient's inferred preference, in prone, deep myofascial release to piriformis and obturator internus - difficult to assess through clothing and denied any specific tenderness in bilateral deep hip/buttocks muscles. Noted increased tightness L buttocks/hip and anteriorly with stretch away from tailbone while in prone, so moved to supine. In supine, hip flexor stretch with R LE over edge of plinth - complained of increased pain in R knee more so than in hip/groin/abdomen. While in supine, reassessed scarring in abdomen - educated in benefits of increased focus on manual techniques and patient agreeable to restart more effective manual interventions. Since following patient cancelled, patient agreeable to continue past scheduled time to address external manual interventions today and begin internal/transvaginal and periurethral interventions next session. 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 Cross hand myofascial release to abdominals followed by generalized visceral mobilization. Patient Education -   Extensive education on anatomy of pelvis including PF muscles and pelvic organs. Emphasized need for stretching and down training of tight muscles and strengthening of weak muscles to promote improve muscle balance in pelvis/low back and legs. Used PF model to demonstrate transvaginal PF muscle assessment to which patient verbally consented. Patient appeared to have better understanding of current bladder/bowel issues and improved outlook on situation by end of PF PT therapy session.  Issued and reviewed handouts on , contributing factors to PF dysfunction, normal bladder health/bladder irritants, diaphragmatic breathing, quick flicks for urge suppression.      Reviewed stretching to counteract tightening and shortening due to functional mobility and exercise. Reviewed need for exhalation on exertion to improve intra-abdominal/pelvis pressures and lessen pressure on pelvic organs, specifically the bladder which can lead to urinary urgency, frequency, and incontinence. Spoke with daughter and patient about not overwhelming with stretches but to try to increase slowly able with unresolved knee pain from her recent fall. Reviewed benefits and mechanism of action of bladder control medication - patient seems most dissatisfied as she does not believe they are helping her and does not appreciate the side effects. Had appointment with urogyn on 4/14 so suggested discussing at that appointment. Patient reports that she needs to finish out her current medications and can discuss discontinuing them at her next follow up appoint in July 2022. - Patient continues to emphasize that for multiple reasons she would like to discontinue her bladder control medications - educated on benefits for manual interventions directed at underlying tightness that may be contributing to increased bladder sensitivity. Reviewed education and practiced exhaling with sit to stand transfers and then after address core stabilization with TA contraction, adding TA contraction in preparation for transfer with exhalation during activity and patient reported no urine leakage with combination, although reported intermittent urinary leakage with exhalation alone. Issued and reviewed booklet on body mechanics, emphasizing need for TA contraction prior to and during lifting activities and exhalation on exertion with all functional activities and exercises. Patient reports that she stands up to put shoes on and tie them while still standing - educated patient in safety concerns and increased pressure on her back and legs with this method - recommended patient SIT to don and tie her shoes. Practiced bending over to  pen from floor - tends to flex knee bilateral knees and then stooped with back flex - safe but cautioned about technique and breathing/exhaling. Reviewed need to exhale with exertion - including when she pushing foot of recliner down prior to standing, then scoot forward and place feet back prior to exhaling during sit to stand. ASSESSMENT:  Patient continues to report modest improvement in bladder control issues with implementation of several of the behavioral modifications suggested previously. She is experiencing urinary leakage only infrequently, about once every other week and urinary urgency persists, especially after prolonged periods of sitting. Patient continues to report occasional urine leakage when standing from seated position after prolonged sitting and sometimes with bending over, so reviewed optimal techniques with need to exhale upon exertion and performing TA contraction in preparation for movement to improve management of intra-abdominal/pelvic pressure and thus placing less pressure on bladder.  Patient seems uncertain when and how she is leaking urine as she forgets to exhale on exertion consistently. Continue to review and advance core stabilization activities during functional activities. Will continue to reinforce in future sessions as focus on control of intraabdominal pressure during functional activities which patient report are causing her urine leakage. After more extensive education about benefits of manual interventions, patient seems to be agreeable. Initiated external manual interventions with focus on abdominal scarring with moderate adhesions. 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 States that she is no opposed to either external or internal manual interventions if they are suspected to be contributing to her bladder symptoms. Patient would definitely benefit from pelvic floor physical therapy for manual interventions to improve mobility of tight tissues in and around PF muscles and bladder, continued education on healthy bladder/bowel habits and adjustment of behavioral modifications as well as advanced activities to improve muscle control/ coordination and endurance of pelvic floor, core, and hip muscles to decrease urinary frequency, urgency and incontinence, and intermittent constipation. Treatment/Activity Tolerance:    Patient tolerated treatment fairly well     Patient responded well to initiation of core stabilization techniques and initial attempts to incorporate these into her functional activities to decrease urinary incontinence by better managing her intraabdominal pressure. Patient responded well to external manual interventions focused on her abdomen and seems agreeable to attempting external and internal manual interventions during future PF PT appointments.        GOALS:  Patient stated goal: Patient's Goal: \"to have more control over when I go to the bathroom\"and to not have to take bladder control medications  [x]? Progressing: []? Met: []? Not Met: []? Adjusted        Therapist goals for Patient:      Short Term Goals: To be achieved in: 4-5 sessions     1. Patient will have a decrease in urinary urgency, frequency, and incontinence to indicate improvement in pelvic floor strength and relaxation, muscle coordination, and/or bladder retraining. [x]? Progressing: []? Met: []? Not Met: []? Adjusted  2. Perform HEP for pelvic floor and core muscle groups with minimal direction from therapist as she progresses to become more independent managing her pelvic pressure and PF muscle weakness and/or tightness. [x]? Progressing: []? Met: []? Not Met: []? Adjusted  3. Report using 1-2 behavioral modification strategies to reduce urinary/bowel complaints through dietary and mechanical changes, with focus on full emptying of bladder with each urination and using optimal positioning and deep breathing for relaxation of posterior PF muscles during defacation, reducing need to strain. [x]? Progressing: []? Met: []? Not Met: []? Adjusted     Long Term Goals: To be achieved in: 8-10 sessions     1. Improve score of quality of life index measure, PFDI-20, to 80 or less (initial eval - 95.83) disability index to assist with reaching prior level of function and demonstrating improved quality of life with less life interruptions due to urinary urgency, frequency, and incontinence allowing patient to fully participate in socially appropriate activities, including bending and lifting for homemaking chores. []? Progressing: []? Met: []? Not Met: []? Adjusted  2. Patient will report using 1 or less pads for urinary protection to progress towards completing ADLs and recreational activities without leakage. (using about 3 pads/day at initial eval)  []? Progressing: []? Met: []? Not Met: []? Adjusted  3.  Patient will return to functional activities including bending/lifting and transferring sit to stand without increased symptoms/decrased urinary leakage. []? Progressing: []? Met: []? Not Met: []? Adjusted  4. Patient will be able to control her bladder with less urinary urgency, frequency and incontinence without the need for continued medication for bladder control issues. []? Progressing: []? Met: []? Not Met: []? Adjusted            5. Report using 3-4 behavioral modification strategies to reduce urinary/bowel complaints through dietary and mechanical changes, with focus on full emptying of bladder with each urination and using optimal positioning and deep breathing for relaxation of posterior PF muscles during defacation, reducing need to strain. []? Progressing: []? Met: []? Not Met: []? Adjusted            6. Rate severity of the problem related to urinary frequency/urgency/incontinence 3-4 or less on scale of 0-10 with 0 being no problem and 10 being the worst - (7-8/10 reported at intial [facility]). []? Progressing: []? Met: []? Not Met: []? Adjusted            7. Perform HEP for pelvic floor and core muscle groups independently as she progresses to self-manage her pelvic pressure and PF muscle weakness and/or tightness. []? Progressing: []? Met: []? Not Met: []? Adjusted                           Plan:   Continue per plan of care   As needed review any stretches of hip/buttocks muscles - may add hip flexor stretch. If continues to consent at future appointments, shift focus of session to manual assessment and interventions to address tight tissues in PF and surrounding structures. May attempt posterior buttocks/hip releases with patient in prone, possibly without clothing to improve technique and intervention effectiveness. Frequency/Duration:     Patient is making progress toward goals but would continue to benefit from manual therapy to address tight tissues and progressing modification of movements and management of intraabdominal pressures with functional movements.   Patient's next scheduled appointment is on 5/25 at [facility].  Electronically signed by Ynes Dukes, PT #[id number] on 5/10/2022 at 9:51 AM    Note: If patient does not return for scheduled/recommended follow up visits, this note will serve as a discharge from care along with the most recent update on progress.

## 2022-05-09 RX ORDER — MIRABEGRON 25 MG/1
TABLET, FILM COATED, EXTENDED RELEASE ORAL
Qty: 60 TABLET | OUTPATIENT
Start: 2022-05-09

## 2022-05-10 ENCOUNTER — HOSPITAL ENCOUNTER (OUTPATIENT)
Dept: PHYSICAL THERAPY | Age: 87
Setting detail: THERAPIES SERIES
Discharge: HOME | End: 2022-05-10
Payer: MEDICARE

## 2022-05-10 PROCEDURE — 97530 THERAPEUTIC ACTIVITIES: CPT | Performed by: PHYSICAL THERAPIST

## 2022-05-10 PROCEDURE — 97112 NEUROMUSCULAR REEDUCATION: CPT | Performed by: PHYSICAL THERAPIST

## 2022-05-10 PROCEDURE — 97110 THERAPEUTIC EXERCISES: CPT | Performed by: PHYSICAL THERAPIST

## 2022-05-10 PROCEDURE — 97140 MANUAL THERAPY 1/> REGIONS: CPT | Performed by: PHYSICAL THERAPIST

## 2022-05-10 RX ORDER — MIRABEGRON 25 MG/1
TABLET, FILM COATED, EXTENDED RELEASE ORAL
Qty: 60 TABLET | OUTPATIENT
Start: 2022-05-10

## 2022-05-17 ENCOUNTER — TELEPHONE (OUTPATIENT)
Dept: UROGYNECOLOGY | Age: 87
End: 2022-05-17

## 2022-05-17 NOTE — TELEPHONE ENCOUNTER
Patient calling stating that her prescription for Myrbetriq was getting denied through her [facility] [email or web address] mail in service. Patient gave this RN phone number to call to give authorization. [phone]. Per last note patient was given option to discontinue medication and trial off and continue PFPT. After much discussion patient stated she would like to continue medication at this time. This RN states she would call [employer] and get patient medication. Patient thankful and had no further questions at this time.

## 2022-05-19 NOTE — PROGRESS NOTES
[facility] [address]  Phone: [phone]  Fax: [phone]        Physical Therapy Daily Treatment Note    Date: 2022    Patient Name: Luis Manuel Velazquez : 3/18/1932 MRN: [id number]    Restrictions/Precautions:    Medical/Treatment Diagnosis Information:    · Diagnosis: R35.0- Urinary frequency; R39.15 - Urinary urgency    Insurance/Certification information: PT Insurance Information: Medicare    Physician Information:   IKER Zamora CNP      Plan of care signed (Y/N): Y  Cosigned by: IKER Zamora CNP at 3/23/2022  9:04 AM       Visit# / total visits: 6/10    Medicare Cap (if applicable):    $4,926.50 = total amount used, updated 2022    Time in: 1500  Time Out: 1610  Total Treatment Time: 70 minutes    Charges: Therapeutic Activity (41016)x2    Therapeutic Exercise (52410)x1    Manual (19441) - x1    Neuromuscular Re-ed (76002)x1      ________________________________________________________________________________________    Pain Level: denies    SUBJECTIVE:     Patient drove herself to therapy today, so her daughter did not attend with session with her. Patient continues with focus on teeth sensitivity - has to go to a specialist to address tooth issue. Reports bladder problems improving - new bladder control medication arrived but patient not sure if she wants to continue taking since it is so expensive and bladder issues are better. Patient continues to be frustrated with the expense of her bladder medication. Would like to not have to take bladder medication eventually, but agreed would continue until July per Shira Lawson at Dr. Fox Rojo office. Has enough medication for 60 more days, so with encouragement willing to continue with taking bladder control medications for now. Patient reports generally getting up only once a night - trying to limit fluids in the evening.  Patient continues to report that rocking still helps to more fully empty the bladder. Typically urinating every ~2-3 hours. Patient reports some, occasional stress incontinence when she exerts herself, but really started on bladder control medications due to urinary urgency which has lessened. Less urinary urgency when exhaling prior to standing up after sitting for an extended period of time or occasionally with bending over to pick things up.      OBJECTIVE:    Treatment Interventions Implemented  Exercise/Neuromuscular Re-education - Written HEP instructions provided and reviewed     Diaphragmatic breathing - coordinating with pelvic floor muscle activities - tactile cues on stomach - cues for PF descent with inhalation and educated patient to AVOID holding PF contraction     PF muscle control exercises -  supine - transvaginal feedback - QUICK FLICKS - 10 reps with cues to use for urge suppression and may practice for exercise ~2 times/day    Patient reports performing fairly regularly at home and without questions or concerns regarding following -   Happy baby - hold for 1 minute = 10 slow, deep breaths s 2-3 reps OR 2-3 minutes total  Hip add stretch - supine - hold 8-10 breaths/~1 minute x 2-3 reps each LE OR 2-3 minutes total   Piriformis stretch - figure four sitting - hold 8-10 breaths/~1 minute x 2 reps each LE - definitely prefers sitting stretch to supine since difficulty pulling thigh towards chest in supine - reviewed - cues for technique - continues to be tighter on R compared to L  Reviewed -   Lateral thigh/piriformis stretch - one leg crossed over the other in supine -   hold for 1 minute = 10 slow, deep breaths x 2-3 reps    Posterior pelvic tilts - hold for 5-10 seconds x 10 reps - tactile and verbal cues to activate TA and to avoid clenching of PF muscles - reviewed - needed tactile, verbal and visual cues to perform correctly - cues to flex knees and hips in hooklying on supine - did best to place hands in lumbar arch and flatten to press into top of hands  Core stabilization activities - adding alternating shoulder flex, alternating hip flex, the alternating opposite hip/shoulder flex only x 10 reps while maintaining TA contraction with ppt. - cues for coordination of \"dead bug\" motion with tactile and verbal cues for contraction of TA/core for stabilization    Functional Mobility with increased awareness of decreasing demand on intraabdominal pressure -   After initiating core stabilization with TA contraction, practiced sit to stand x 2 with TA contraction in preparation for sit to stand and then exhale during movement with no urinary leakage reported. 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 Cross hand myofascial release to abdominals followed by generalized visceral mobilization. Gentle stretches of umbilicus with moderate tightness especially toward L side of umbilicus with scar extending to this level   Patient verbally consented to transvaginal PF muscle assessment and intervention. Superficial stretch to clitoral jon and surrounding tissues with mild to moderate limitations in mobility. Transvaginal myofascial release and stretching of superficial, middle, and deep layers of PF muscles with focus on areas of increased tension and tissue resistance.   Internal clock - small opening for introitus, history of surgical separation of vulva in January 2022, noted moderate to severe atrophy, redden tissues near clitoris and on R side of introitus, but more white/nonvascular tissue on L side             Superficial - moderate tightness and tenderness at 4-6 o'clock with tight tissue and banding noted at 7-8 o'clock - sweeping stretch to tight areas superficially             Middle/Deep - mild to moderate in R PF, moderate to severe in L PF but did improve slightly with bimanual release  Bimanual myofascial release simultaneously with internal/transvaginal on PF muscles and external on buttocks/later thigh, inner thigh, and abdomen to determine changes in tissue tension with slacking and stretching of the fascia. Focus on L side due to increased tightness and tenderness compared to R. Patient Education -   Extensive education on anatomy of pelvis including PF muscles and pelvic organs. Emphasized need for stretching and down training of tight muscles and strengthening of weak muscles to promote improve muscle balance in pelvis/low back and legs. Used PF model to demonstrate transvaginal PF muscle assessment to which patient verbally consented. Patient appeared to have better understanding of current bladder/bowel issues and improved outlook on situation by end of PF PT therapy session.  Issued and reviewed handouts on , contributing factors to PF dysfunction, normal bladder health/bladder irritants, diaphragmatic breathing, quick flicks for urge suppression.      Reviewed stretching to counteract tightening and shortening due to functional mobility and exercise. Reviewed need for exhalation on exertion to improve intra-abdominal/pelvis pressures and lessen pressure on pelvic organs, specifically the bladder which can lead to urinary urgency, frequency, and incontinence. Reports trying to remember to exhale with sit to stand more consistently, but admittedly does not always remember.    Reviewed benefits and mechanism of action of bladder control medication - patient seems most dissatisfied as she does not believe they are helping her and does not appreciate the side effects. Had appointment with urogyn on 4/14 so suggested discussing at that appointment. Patient reports that she needs to finish out her current medications and can discuss discontinuing them at her next follow up appoint in July 2022. - Patient continues to emphasize that for multiple reasons she would like to discontinue her bladder control medications - educated on benefits for manual interventions directed at underlying tightness that may be contributing to increased bladder sensitivity. Patient reluctantly agreeable to continuing bladder control medications until follow up scheduled in mid July. Reviewed education and practiced exhaling with sit to stand transfers and then after address core stabilization with TA contraction, adding TA contraction in preparation for transfer with exhalation during activity and patient reported no urine leakage with combination, although reported intermittent urinary leakage with exhalation alone. Issued and reviewed booklet on body mechanics, emphasizing need for TA contraction prior to and during lifting activities and exhalation on exertion with all functional activities and exercises. Patient previously reported that she stands up to put shoes on and tie them while still standing - reviewed and emphasized safety concerns and increased pressure on her back and legs with this method - recommended patient SIT to don and tie her shoes which she reports doing \"sometimes when I remember. \"  Reviewed need to exhale with exertion - including when she pushing foot of recliner down prior to standing, then scoot forward and place feet back prior to exhaling during sit to stand.     Reviewed benefits of manual interventions to assist with improve tissue mobility of vulvar tissue as well as PF muscles to improve flexibility of bladder and surrounding tissues thus decreasing bladder symptoms. Emphasized current combined benefits of medication and manual interventions working to improve elasticity of bladder and thus decrease her bladder symptoms. ASSESSMENT:  Patient continues to report improvement in bladder control issues including infrequent urinary leakage mostly when she exerts herself and does not remember to exhale. She also reports getting up to urinate generally only once at night and has decreased urinary urge after sitting down for prolonged periods of time. Patient seems to be more consistent with exhaling during exertional activites but does report forgets on occasion. Reviewed combined benefits of both bladder control medications, stretches and behavior modifications for home program and PF PF for manual interventions are contributing to her improving bladder control. Encouraged patient to continue bladder control medication until she follows up with urogynecology in mid-July. Reviewed core stabilization activities both in isolation and during functional activities. Will continue to reinforce in future sessions as focus on control of intraabdominal pressure during functional activities which patient report are causing her urine leakage. Patient able to tolerate external and transvaginal manual interventions. Addressed adhesions of clitoral jon and around urethral opening as well as superficial and deeper PF muscles which continue to demonstrate up to moderate tightness. Noted decrease skin integrity in vestibule.    Patient would definitely benefit from pelvic floor physical therapy for manual interventions to improve mobility of tight tissues in and around PF muscles and bladder, continued education on healthy bladder/bowel habits and adjustment of behavioral modifications as well as advanced activities to improve muscle control/ coordination and endurance of pelvic floor, core, and hip muscles to decrease urinary frequency, urgency and incontinence, and intermittent constipation. Treatment/Activity Tolerance:    Patient tolerated treatment fairly well     Patient needed additional coaching on TA contraction with posterior pelvic tile and core stabilization techniques. Patient responded well to external and transvaginal manual interventions - denies any lingering pain or discomfort from manual techniques at end of therapy session. GOALS:  Patient stated goal: Patient's Goal: \"to have more control over when I go to the bathroom\"and to not have to take bladder control medications  [x]? Progressing: []? Met: []? Not Met: []? Adjusted        Therapist goals for Patient:      Short Term Goals: To be achieved in: 4-5 sessions     1. Patient will have a decrease in urinary urgency, frequency, and incontinence to indicate improvement in pelvic floor strength and relaxation, muscle coordination, and/or bladder retraining. [x]? Progressing: []? Met: []? Not Met: []? Adjusted  2. Perform HEP for pelvic floor and core muscle groups with minimal direction from therapist as she progresses to become more independent managing her pelvic pressure and PF muscle weakness and/or tightness. [x]? Progressing: []? Met: []? Not Met: []? Adjusted  3. Report using 1-2 behavioral modification strategies to reduce urinary/bowel complaints through dietary and mechanical changes, with focus on full emptying of bladder with each urination and using optimal positioning and deep breathing for relaxation of posterior PF muscles during defacation, reducing need to strain. [x]? Progressing: []? Met: []? Not Met: []? Adjusted     Long Term Goals: To be achieved in: 8-10 sessions     1.  Improve score of quality of life index measure, PFDI-20, to 80 or less (initial eval - 95.83) disability index to assist with reaching prior level of function and demonstrating improved quality of life with less life interruptions due to urinary urgency, frequency, and incontinence allowing patient to fully participate in socially appropriate activities, including bending and lifting for homemaking chores. []? Progressing: []? Met: []? Not Met: []? Adjusted  2. Patient will report using 1 or less pads for urinary protection to progress towards completing ADLs and recreational activities without leakage. (using about 3 pads/day at initial eval)  []? Progressing: []? Met: []? Not Met: []? Adjusted  3. Patient will return to functional activities including bending/lifting and transferring sit to stand without increased symptoms/decrased urinary leakage. []? Progressing: []? Met: []? Not Met: []? Adjusted  4. Patient will be able to control her bladder with less urinary urgency, frequency and incontinence without the need for continued medication for bladder control issues. []? Progressing: []? Met: []? Not Met: []? Adjusted            5. Report using 3-4 behavioral modification strategies to reduce urinary/bowel complaints through dietary and mechanical changes, with focus on full emptying of bladder with each urination and using optimal positioning and deep breathing for relaxation of posterior PF muscles during defacation, reducing need to strain. []? Progressing: []? Met: []? Not Met: []? Adjusted            6. Rate severity of the problem related to urinary frequency/urgency/incontinence 3-4 or less on scale of 0-10 with 0 being no problem and 10 being the worst - (7-8/10 reported at intial eval). []? Progressing: []? Met: []? Not Met: []? Adjusted            7. Perform HEP for pelvic floor and core muscle groups independently as she progresses to self-manage her pelvic pressure and PF muscle weakness and/or tightness. []? Progressing: []? Met: []? Not Met: []? Adjusted                           Plan:   Continue per plan of care   As needed review any stretches of hip/buttocks muscles - may add hip flexor stretch.    If continues to consent at future appointments, continue focus of session to manual assessment and interventions to address tight tissues in PF and surrounding structures, including prevention of adhesions in vestibule from recurring. May attempt posterior buttocks/hip releases with patient in prone, possibly without clothing to improve technique and intervention effectiveness. Frequency/Duration:     Patient is making progress toward goals but would continue to benefit from manual therapy to address tight tissues and progressing modification of movements and management of intraabdominal pressures with functional movements. Patient's next scheduled appointment is on 6/8 at [facility] continue bi-weekly PF PT appointments until returns for urogyn follow up in mid-July. Electronically signed by Aki Low, PT #[id number] on 5/25/2022 at 2:59 PM    Note: If patient does not return for scheduled/recommended follow up visits, this note will serve as a discharge from care along with the most recent update on progress.

## 2022-05-25 ENCOUNTER — HOSPITAL ENCOUNTER (OUTPATIENT)
Dept: PHYSICAL THERAPY | Age: 87
Setting detail: THERAPIES SERIES
Discharge: HOME | End: 2022-05-25
Payer: MEDICARE

## 2022-05-25 PROCEDURE — 97530 THERAPEUTIC ACTIVITIES: CPT | Performed by: PHYSICAL THERAPIST

## 2022-05-25 PROCEDURE — 97110 THERAPEUTIC EXERCISES: CPT | Performed by: PHYSICAL THERAPIST

## 2022-05-25 PROCEDURE — 97112 NEUROMUSCULAR REEDUCATION: CPT | Performed by: PHYSICAL THERAPIST

## 2022-05-25 PROCEDURE — 97140 MANUAL THERAPY 1/> REGIONS: CPT | Performed by: PHYSICAL THERAPIST

## 2022-06-01 NOTE — PROGRESS NOTES
[address], [place] [facility] [address]  Phone: [phone]  Fax: [phone]        Physical Therapy Daily Treatment Note    Date: 2022    Patient Name: Dominga Robbins : 3/18/1932 MRN: [id number]    Restrictions/Precautions:    Medical/Treatment Diagnosis Information:    · Diagnosis: R35.0- Urinary frequency; R39.15 - Urinary urgency    Insurance/Certification information: PT Insurance Information: Medicare    Physician Information:   IKER Argueta CNP      Plan of care signed (Y/N): Y  Cosigned by: IKER Argueta CNP at 3/23/2022  9:04 AM       Visit# / total visits: 7/10    Medicare Cap (if applicable):    $7,907.20  = total amount used, updated 2022    Time in: 1600  Time Out: 1710  Total Treatment Time: 70 minutes    Charges: Therapeutic Activity (31162)x2    Therapeutic Exercise (11606)x1    Manual (01183) - x2    Neuromuscular Re-ed (99196) - NA      ________________________________________________________________________________________    Pain Level: denies    SUBJECTIVE:     Patient drove herself to therapy today, so her daughter did not attend with session with her. Patient reports that she is still taking her bladder medications and will continue to do so at least until she sees IKER Argueta CNP, in July. Patient continues to state that she eventually wants to stop taking bladder control medications and is hopeful that implementing her behavioral modifications and performing her exercises are enough for adequate bladder control. Patient reports generally getting up only once a night - trying to limit fluids in the evening. Patient continues to report that rocking still helps to more fully empty the bladder. Typically urinating every ~2-3 hours. Patient reports minimal urinary leakage contained to her pad when she laid in bed too long prior to getting up for the day.  Patient remembering to exhale with exertional activities, including sit to stand. Patient reports some soreness that lasted about a day after manual interventions.      OBJECTIVE:    Treatment Interventions Implemented  Exercise/Neuromuscular Re-education - Written HEP instructions provided and reviewed     Diaphragmatic breathing - coordinating with pelvic floor muscle activities - tactile cues on stomach - cues for PF descent with inhalation and educated patient to AVOID holding PF contraction     PF muscle control exercises -  supine - transvaginal feedback - QUICK FLICKS - 10 reps with cues to use for urge suppression and may practice for exercise ~2 times/day    Patient reports performing fairly regularly at home and without questions or concerns regarding following -   Happy baby - hold for 1 minute = 10 slow, deep breaths s 2-3 reps OR 2-3 minutes total  Hip add stretch - supine - hold 8-10 breaths/~1 minute x 2-3 reps each LE OR 2-3 minutes total   Piriformis stretch - figure four sitting - hold 8-10 breaths/~1 minute x 2 reps each LE - definitely prefers sitting stretch to supine since difficulty pulling thigh towards chest in supine - reviewed - cues for technique - continues to be tighter on R compared to L    Lateral thigh/piriformis stretch - one leg crossed over the other in supine -   hold for 1 minute = 10 slow, deep breaths x 2-3 reps    Posterior pelvic tilts - hold for 5-10 seconds x 10 reps - tactile and verbal cues to activate TA and to avoid clenching of PF muscles - reviewed - needed tactile, verbal and visual cues to perform correctly - cues to flex knees and hips in hooklying on supine - did best to place hands in lumbar arch and flatten to press into top of hands  Core stabilization activities - adding alternating shoulder flex, alternating hip flex, the alternating opposite hip/shoulder flex only x 10 reps while maintaining TA contraction with ppt. - cues for coordination of \"dead bug\" motion with tactile and verbal cues for contraction of TA/core for stabilization    Functional Mobility with increased awareness of decreasing demand on intraabdominal pressure -   After initiating core stabilization with TA contraction, practiced sit to stand x 2 with TA contraction in preparation for sit to stand and then exhale during movement with no urinary leakage reported. Wall squats - hold 2-3 seconds, ensure TA contraction and exhale with pushing to standing upright  Single leg stance with occasional UE support for ~ 10 second x 2 each LE- core stabilization more imbalanced on R foot due to decrease sensation  Hip add sets - seated - squeezing into fisted hands placed between knees x 10 reps, hold for 5 seconds each - overflow for gentle/automatic contraction of PF muscles  Added-  Hip flexor stretch with each LE over side of mat table - -  hold for 1 minute = 10 slow, deep breaths x 2-3 reps       Manual Interventions -   Cross friction massage to abdominal scars - 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 Cross hand myofascial release to abdominals followed by generalized visceral mobilization. Gentle stretches of umbilicus with moderate tightness especially toward L side of umbilicus with scar extending to this level. Patient verbally consented to transvaginal PF muscle assessment and intervention. Superficial stretch to clitoral jon, urethral meatus and surrounding tissues with mild to moderate limitations in mobility. Adhesions forming in anterior tissue and able to gently release adhesions with sustained stretch - noted whitish tissue on surface but more reddened tissue once released. Transvaginal myofascial release and stretching of superficial, middle, and deep layers of PF muscles with focus on areas of increased tension and tissue resistance.   Internal clock - small opening for introitus, history of surgical separation of vulva in January 2022, noted moderate to severe atrophy, redden tissues near clitoris and on R side of introitus, but more white/nonvascular tissue on L side             Superficial - moderate tightness and tenderness at 4-6 o'clock with tight tissue and banding noted at 7-8 o'clock - sweeping stretch to tight areas superficially             Middle/Deep - mild to moderate in R PF, moderate to severe in L PF but did improve slightly with bimanual release  Bimanual myofascial release simultaneously with internal/transvaginal on PF muscles and external on buttocks/later thigh, inner thigh, and abdomen to determine changes in tissue tension with slacking and stretching of the fascia. Performed bilaterally. Patient Education -   Extensive education on anatomy of pelvis including PF muscles and pelvic organs. Emphasized need for stretching and down training of tight muscles and strengthening of weak muscles to promote improve muscle balance in pelvis/low back and legs. Used PF model to demonstrate transvaginal PF muscle assessment to which patient verbally consented. Patient appeared to have better understanding of current bladder/bowel issues and improved outlook on situation by end of PF PT therapy session.  Issued and reviewed handouts on , contributing factors to PF dysfunction, normal bladder health/bladder irritants, diaphragmatic breathing, quick flicks for urge suppression.      Reviewed stretching to counteract tightening and shortening due to functional mobility and exercise. Reviewed need for exhalation on exertion to improve intra-abdominal/pelvis pressures and lessen pressure on pelvic organs, specifically the bladder which can lead to urinary urgency, frequency, and incontinence. Reports trying to remember to exhale with sit to stand more consistently, but admittedly does not always remember.    Reviewed benefits and mechanism of action of bladder control medication - patient seems most dissatisfied as she does not Emphasized current combined benefits of medication and manual interventions working to improve elasticity of bladder and thus decrease her bladder symptoms. Educated patient that she may need to separate tissue of labia when applying topical cream twice a week - use pelvic floor model to demonstrate use of one hand/several fingers to separate skin/tissue and other hand/finger to apply cream/ointment. Patient seems to be forming at least mild adhesions in superficial layers which are easily released with gentle stretching of surrounding tissues. ASSESSMENT:  Patient continues to report improvement in bladder control issues including infrequent urinary leakage, mostly when she holds her urine too long and then exerts herself and does not remember to exhale. She also reports getting up to urinate generally only once at night and has decreased urinary urge after sitting down for prolonged periods of time. Patient seems to be more consistent with exhaling during exertional activites but does report forgets on occasion. Reviewed combined benefits of both bladder control medications, stretches and behavior modifications for home program and PF PF for manual interventions are contributing to her improving bladder control. Encouraged patient to continue bladder control medication until she follows up with urogynecology in mid-July, which she is doing reluctantly and continues to remind PF PT that she fully intends to discontinue this medication. Added stretch for hip flexion due to ongoing tightness especially in anterior chain with increasing kyphotic posture. Patient able to tolerate external and transvaginal manual interventions. Addressed adhesions of clitoral jon and around urethral opening as well as superficial and deeper PF muscles which continue to demonstrate up to moderate tightness.  Noted decrease skin integrity in vestibule and adhesions forming in anterior superficial layers which were released with gentle stretching of surrounding tissues. Uncertain patient is able or willing to separate these tissues well enough to apply medication in an effective manner. Patient would definitely benefit from pelvic floor physical therapy for manual interventions to improve mobility of tight tissues in and around PF muscles and bladder, continued education on healthy bladder/bowel habits and adjustment of behavioral modifications as well as advanced activities to improve muscle control/ coordination and endurance of pelvic floor, core, and hip muscles to decrease urinary frequency, urgency and incontinence, and intermittent constipation. Bladder issues and constipation are much improved although not completely resolved. Intend to continue with PF PT to address these issues but also to continue to ensure adhesions are not reforming in her introitus. Treatment/Activity Tolerance:    Patient tolerated treatment fairly well   Patient responded well to external and transvaginal manual interventions - reports pain or discomfort from manual techniques lasted about a day after last session. Noted adhesions forming and used manual techniques to release. GOALS:  Patient stated goal: Patient's Goal: \"to have more control over when I go to the bathroom\"and to not have to take bladder control medications  [x]? Progressing: []? Met: []? Not Met: []? Adjusted        Therapist goals for Patient:      Short Term Goals: To be achieved in: 4-5 sessions     1. Patient will have a decrease in urinary urgency, frequency, and incontinence to indicate improvement in pelvic floor strength and relaxation, muscle coordination, and/or bladder retraining.  []? Progressing: [x]? Met: []? Not Met: []? Adjusted  2. Perform HEP for pelvic floor and core muscle groups with minimal direction from therapist as she progresses to become more independent managing her pelvic pressure and PF muscle weakness and/or tightness. []?  Progressing: [x]? Met: []? Not Met: []? Adjusted  3. Report using 1-2 behavioral modification strategies to reduce urinary/bowel complaints through dietary and mechanical changes, with focus on full emptying of bladder with each urination and using optimal positioning and deep breathing for relaxation of posterior PF muscles during defacation, reducing need to strain. []? Progressing: [x]? Met: []? Not Met: []? Adjusted     Long Term Goals: To be achieved in: 8-10 sessions     1. Improve score of quality of life index measure, PFDI-20, to 80 or less (initial eval - 95.83) disability index to assist with reaching prior level of function and demonstrating improved quality of life with less life interruptions due to urinary urgency, frequency, and incontinence allowing patient to fully participate in socially appropriate activities, including bending and lifting for homemaking chores. []? Progressing: []? Met: []? Not Met: []? Adjusted  2. Patient will report using 1 or less pads for urinary protection to progress towards completing ADLs and recreational activities without leakage. (using about 3 pads/day at initial eval)  []? Progressing: []? Met: []? Not Met: []? Adjusted  3. Patient will return to functional activities including bending/lifting and transferring sit to stand without increased symptoms/decrased urinary leakage. [x]? Progressing: []? Met: []? Not Met: []? Adjusted  4. Patient will be able to control her bladder with less urinary urgency, frequency and incontinence without the need for continued medication for bladder control issues. [x]? Progressing: []? Met: []? Not Met: []? Adjusted            5.  Report using 3-4 behavioral modification strategies to reduce urinary/bowel complaints through dietary and mechanical changes, with focus on full emptying of bladder with each urination and using optimal positioning and deep breathing for relaxation of posterior PF muscles during defacation, reducing need to strain. [x]? Progressing: []? Met: []? Not Met: []? Adjusted            6. Rate severity of the problem related to urinary frequency/urgency/incontinence 3-4 or less on scale of 0-10 with 0 being no problem and 10 being the worst - (7-8/10 reported at intial al). []? Progressing: []? Met: []? Not Met: []? Adjusted            7. Perform HEP for pelvic floor and core muscle groups independently as she progresses to self-manage her pelvic pressure and PF muscle weakness and/or tightness. []? Progressing: []? Met: []? Not Met: []? Adjusted                           Plan:   Continue per plan of care   As needed review any stretches of hip/buttocks muscles. If continues to consent at future appointments, continue focus of session to manual assessment and interventions to address tight tissues in PF and surrounding structures, including prevention of adhesions in vestibule from recurring. May attempt posterior buttocks/hip releases with patient in prone, possibly without clothing to improve technique and intervention effectiveness. Frequency/Duration:     Patient is making progress toward goals but would continue to benefit from manual therapy to address tight tissues and progressing modification of movements and management of intraabdominal pressures with functional movements. Patient's next scheduled appointment is on 6/29 at 11AM.  Anticipate continue PF PT appointments every ~2 weeks until returns for urogyn follow up in mid-July. Electronically signed by Ynes Dukes, PT #[id number] on 6/8/2022 at 8:03 AM    Note: If patient does not return for scheduled/recommended follow up visits, this note will serve as a discharge from care along with the most recent update on progress.

## 2022-06-08 ENCOUNTER — HOSPITAL ENCOUNTER (OUTPATIENT)
Dept: PHYSICAL THERAPY | Age: 87
Setting detail: THERAPIES SERIES
Discharge: HOME | End: 2022-06-08
Payer: MEDICARE

## 2022-06-08 PROCEDURE — 97110 THERAPEUTIC EXERCISES: CPT | Performed by: PHYSICAL THERAPIST

## 2022-06-08 PROCEDURE — 97530 THERAPEUTIC ACTIVITIES: CPT | Performed by: PHYSICAL THERAPIST

## 2022-06-08 PROCEDURE — 97140 MANUAL THERAPY 1/> REGIONS: CPT | Performed by: PHYSICAL THERAPIST

## 2022-06-23 NOTE — PROGRESS NOTES
Meri Nevarez AyrMckinley cruz Gigi  Phone: [phone]  Fax: [phone]        Physical Therapy Daily Treatment Note    Date: 2022    Patient Name: Elijah Del Toro : 3/18/1932 MRN: [id number]    Restrictions/Precautions:    Medical/Treatment Diagnosis Information:    · Diagnosis: R35.0- Urinary frequency; R39.15 - Urinary urgency    Insurance/Certification information: PT Insurance Information: Medicare    Physician Information:   IKER Lynch CNP      Plan of care signed (Y/N): Y  Cosigned by: IKER Lynch CNP at 3/23/2022  9:04 AM       Visit# / total visits: 8/10    Medicare Cap (if applicable):    $1,374.03  = total amount used, updated 2022    Time in: 1100  Time Out: 1210  Total Treatment Time: 70 minutes    Charges: Therapeutic Activity (44884)x2    Therapeutic Exercise (13928)x1    Manual (84621) - x2    Neuromuscular Re-ed (38073) - NA      ________________________________________________________________________________________    Pain Level: denies    SUBJECTIVE:     Patient drove herself to therapy today. Daughter is taking her granddaughter to college orientation today. Patient reports that she is still taking her bladder medications and will continue to do so at least until she sees IKER Lynch CNP, in July. But, patient continues to state that she wants to stop taking bladder control medications and is hopeful that implementing her behavioral modifications and performing her exercises are enough for adequate bladder control. Patient feels that her eye sight is getting worse, so definitely wants to get off bladder medication since believes this may be a possible side effect. Patient reports generally getting up only once a night - trying to limit fluids in the evening. Patient continues to report that rocking still helps to more fully empty the bladder.   Typically urinating every ~2-3 hours. Occasional urinary leakage when gets up from sitting for several hours - urine leakage is contained in pad - usually happens about 1-2X/week, but patient has limited short term memory and seems uncertain of frequency. (Reports occurred several times a day prior to initiating PF PT and bladder control meds.) Patient tries to remember to exhale with exertional activities, including sit to stand, which is generally effective when she remembers to use this strategy. Patient continues to be upset about her dental issues and costs. Patient reports less soreness after manual interventions after last session. Patient reports now aware of need to spread labia to better apply her cream to perineum.      OBJECTIVE:  Severity of problem: ~4/10 - still has some urine leakage when she stands up after sitting for longer periods of time - forgetful and does not always remember to exhale   Treatment Interventions Implemented  Exercise/Neuromuscular Re-education - Written HEP instructions provided and reviewed     Diaphragmatic breathing - coordinating with pelvic floor muscle activities - tactile cues on stomach - cues for PF descent with inhalation and educated patient to AVOID holding PF contraction     PF muscle control exercises -  supine - transvaginal feedback - QUICK FLICKS - 10 reps with cues to use for urge suppression and may practice for exercise ~2 times/day    Patient reports performing fairly regularly at home and without questions or concerns regarding following -   Happy baby - hold for 1 minute = 10 slow, deep breaths s 2-3 reps OR 2-3 minutes total  Hip add stretch - supine - hold 8-10 breaths/~1 minute x 2-3 reps each LE OR 2-3 minutes total   Piriformis stretch - figure four sitting - hold 8-10 breaths/~1 minute x 2 reps each LE - definitely prefers sitting stretch to supine since difficulty pulling thigh towards chest in supine - reviewed - cues for technique - continues to be tighter on R compared to L    Lateral thigh/piriformis stretch - one leg crossed over the other in supine -   hold for 1 minute = 10 slow, deep breaths x 2-3 reps    Posterior pelvic tilts - hold for 5-10 seconds x 10 reps - tactile and verbal cues to activate TA and to avoid clenching of PF muscles - reviewed - needed tactile, verbal and visual cues to perform correctly - cues to flex knees and hips in hooklying on supine - did best to place hands in lumbar arch and flatten to press into top of hands  Core stabilization activities - adding alternating shoulder flex, alternating hip flex, the alternating opposite hip/shoulder flex only x 10 reps while maintaining TA contraction with ppt. - cues for coordination of \"dead bug\" motion with tactile and verbal cues for contraction of TA/core for stabilization    Functional Mobility with increased awareness of decreasing demand on intraabdominal pressure -   After initiating core stabilization with TA contraction, practiced sit to stand x 2 with TA contraction in preparation for sit to stand and then exhale during movement with no urinary leakage reported. Wall squats - hold 2-3 seconds, ensure TA contraction and exhale with pushing to standing upright  Single leg stance with occasional UE support for ~ 10 second x 2 each LE- core stabilization more imbalanced on R foot due to decrease sensation  Hip add sets - seated - squeezing into fisted hands placed between knees x 10 reps, hold for 5 seconds each - overflow for gentle/automatic contraction of PF muscles    Hip flexor stretch with each LE over side of mat table - -  hold for 1 minute = 10 slow, deep breaths x 2-3 reps - having some difficulty finding effective surface to allow ample stretch - reviewed options for positions and various surfaces to attempt      Manual Interventions -   Cross friction massage to abdominal scars - 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 Cross hand myofascial release to abdominals followed by generalized visceral mobilization. Gentle stretches of umbilicus with moderate tightness especially toward L side of umbilicus with scar extending to this level. Patient verbally consented to transvaginal PF muscle assessment and intervention. Superficial stretch to clitoral jon, urethral meatus and surrounding tissues with mild to moderate limitations in mobility. No specifi adhesions noted - noted whitish tissue on surface of L introitos but more reddened tissue once released with transvaginal techniques below. Transvaginal myofascial release and stretching of superficial, middle, and deep layers of PF muscles with focus on areas of increased tension and tissue resistance. Internal clock - small opening for introitus, history of surgical separation of vulva in January 2022, noted moderate to severe atrophy, redden tissues near clitoris and on R side of introitus, but more white/nonvascular tissue on L side - increased vascularization and reddeness of tissue after pill rolling of superficial layer on L and then deeper release to lateral/posterior PF/hip muscles as noted below             Superficial - mild tightness and tenderness at 4-8 o'clock - sweeping/ironing then pill rolling release tight areas superficially             Middle/Deep - mild tightness and no specific tenderness in R PF, mild to moderate in L PF but did improve slightly with bimanual release  Bimanual myofascial release simultaneously with internal/transvaginal on PF muscles and external on buttocks/later thigh, inner thigh, and abdomen to determine changes in tissue tension with slacking and stretching of the fascia. Performed bilaterally. In prone, deep myofascial release to piriformis and obturator internus follow by massage and more superficial myofascial release to buttocks/lower back/upper posterior thigh.  Focus on L side but mild to no specific tightness or tenderness noted. Patient Education -   Extensive education on anatomy of pelvis including PF muscles and pelvic organs. Emphasized need for stretching and down training of tight muscles and strengthening of weak muscles to promote improve muscle balance in pelvis/low back and legs. Used PF model to demonstrate transvaginal PF muscle assessment to which patient verbally consented. Patient appeared to have better understanding of current bladder/bowel issues and improved outlook on situation by end of PF PT therapy session.  Issued and reviewed handouts on , contributing factors to PF dysfunction, normal bladder health/bladder irritants, diaphragmatic breathing, quick flicks for urge suppression.      Reviewed stretching to counteract tightening and shortening due to functional mobility and exercise. Reviewed need for exhalation on exertion to improve intra-abdominal/pelvis pressures and lessen pressure on pelvic organs, specifically the bladder which can lead to urinary urgency, frequency, and incontinence. Reports trying to remember to exhale with sit to stand more consistently, but admittedly does not always remember - forgetful with some short term memory loss. Reviewed benefits and mechanism of action of bladder control medication - patient seems most dissatisfied as she does not believe they are helping her and does not appreciate the side effects. Had appointment with urogyn on 4/14 so suggested discussing at that appointment. Patient reports that she needs to finish out her current medications and can discuss discontinuing them at her next follow up appoint in July 2022. - Patient continues to emphasize that for multiple reasons she would like to discontinue her bladder control medications - educated on benefits for manual interventions directed at underlying tightness that may be contributing to increased bladder sensitivity.  Patient reluctantly agreeable to continuing bladder control medications until follow up scheduled in mid July. Reviewed education and practiced exhaling with sit to stand transfers and then after address core stabilization with TA contraction, adding TA contraction in preparation for transfer with exhalation during activity and patient reported no urine leakage with combination, although reported intermittent urinary leakage with exhalation alone. Issued and reviewed booklet on body mechanics, emphasizing need for TA contraction prior to and during lifting activities and exhalation on exertion with all functional activities and exercises. Patient reported again that she sometimes continues to stands up to put shoes on and tie them while still standing - reviewed and emphasized safety concerns and increased pressure on her back and legs with this method - recommended patient SIT to don and tie her shoes which she reports doing \"sometimes when I remember. \"  Reviewed need to exhale with exertion - including when she pushing foot of recliner down prior to standing, then scoot forward and place feet back prior to exhaling during sit to stand. Reviewed benefits of manual interventions to assist with improve tissue mobility of vulvar tissue as well as PF muscles to improve flexibility of bladder and surrounding tissues thus decreasing bladder symptoms. Emphasized current combined benefits of medication and manual interventions working to improve elasticity of bladder and thus decrease her bladder symptoms. Educated patient that she may need to separate tissue of labia when applying topical cream twice a week - use pelvic floor model to demonstrate use of one hand/several fingers to separate skin/tissue and other hand/finger to apply cream/ointment. Patient seems to be forming at least mild adhesions in superficial layers which are easily released with gentle stretching of surrounding tissues.  Patient reports taking time to spread labia when applying cream to perineal area twice a week - noted less adhesions and fairly good vascularized tissue on R side vs L side of introitus. Educated in need to ensure ample coverage on cream on both sides. ASSESSMENT:   Patient continues to report improvement in bladder control issues including infrequent urinary leakage, mostly when she holds her urine too long and then exerts herself and does not remember to exhale. She also reports getting up to urinate generally only once at night and has decreased urinary urge after sitting down for prolonged periods of time. Patient seems to be more consistent with exhaling during exertional activites but does report forgets on occasion. Reviewed combined benefits of both bladder control medications, stretches and behavior modifications for home program and PF PF for manual interventions are contributing to her improving bladder control. Encouraged patient to continue bladder control medication until she follows up with urogynecology in mid-July, which she is doing reluctantly and continues to remind PF PT that she fully intends to discontinue this medication. Patient able to tolerate external and transvaginal manual interventions. Addressed adhesions of clitoral jon and around urethral opening as well as superficial and deeper PF muscles which continue to demonstrate up to moderate tightness. Noted less adhesions forming in anterior superficial layers since patient more aware of need to separate these tissues well enough to apply medication in an effective manner. With transvaginal manual interventions, noted significant improvement in vascularization of tissue in R side of introitus which as initially white.    Patient would definitely benefit from pelvic floor physical therapy for manual interventions to improve mobility of tight tissues in and around PF muscles and bladder, continued education on healthy bladder/bowel habits and adjustment of behavioral modifications as well as advanced activities to improve muscle control/ coordination and endurance of pelvic floor, core, and hip muscles to decrease urinary frequency, urgency and incontinence, and intermittent constipation. Bladder issues and constipation are much improved although not completely resolved. Intend to continue with PF PT to address these issues but also to continue to ensure adhesions are not reforming in her introitus. Anticipate discontinuing PF PT after next session if no adhesions seem to be forming. Patient will discuss discontinuing bladder medication with urogynecology at her next appointment on 7/14, the day following her next PF PT appointment. Treatment/Activity Tolerance:    Patient tolerated treatment fairly well   Patient responded well to external and transvaginal manual interventions - reported no pain or discomfort from manual techniques after last session and none at end of treatment session today. No adhesions noted. Patient more consistently spreading labia apart to apply cream to perineal area - this spreading on a regular basis should decrease tendency for adhesions to reform. Noted increased vascularization of tissue in introitus after transvaginal manual interventions. GOALS:  Patient stated goal: Patient's Goal: \"to have more control over when I go to the bathroom\"and to not have to take bladder control medications  [x]? Progressing: []? Met: []? Not Met: []? Adjusted        Therapist goals for Patient:      Short Term Goals: To be achieved in: 4-5 sessions     1. Patient will have a decrease in urinary urgency, frequency, and incontinence to indicate improvement in pelvic floor strength and relaxation, muscle coordination, and/or bladder retraining.  []? Progressing: [x]? Met: []? Not Met: []? Adjusted  2.  Perform HEP for pelvic floor and core muscle groups with minimal direction from therapist as she progresses to become more independent managing her pelvic pressure and PF muscle weakness and/or tightness. []? Progressing: [x]? Met: []? Not Met: []? Adjusted  3. Report using 1-2 behavioral modification strategies to reduce urinary/bowel complaints through dietary and mechanical changes, with focus on full emptying of bladder with each urination and using optimal positioning and deep breathing for relaxation of posterior PF muscles during defacation, reducing need to strain. []? Progressing: [x]? Met: []? Not Met: []? Adjusted     Long Term Goals: To be achieved in: 8-10 sessions     1. Improve score of quality of life index measure, PFDI-20, to 80 or less (initial eval - 95.83) disability index to assist with reaching prior level of function and demonstrating improved quality of life with less life interruptions due to urinary urgency, frequency, and incontinence allowing patient to fully participate in socially appropriate activities, including bending and lifting for homemaking chores. []? Progressing: []? Met: []? Not Met: []? Adjusted  2. Patient will report using 1 or less pads for urinary protection to progress towards completing ADLs and recreational activities without leakage. (using about 3 pads/day at initial eval) - 6/28 - partially met -  wearing about 2 pads/day - change when she goes out and may change more frequently than necessary - reports \"definitely using less pads\"   [x]? Progressing: []? Met: []? Not Met: []? Adjusted  3. Patient will return to functional activities including bending/lifting and transferring sit to stand without increased symptoms/decrased urinary leakage. - met - 6/28/22  []? Progressing: [x]? Met: []? Not Met: []? Adjusted  4. Patient will be able to control her bladder with less urinary urgency, frequency and incontinence without the need for continued medication for bladder control issues. - hopeful to discontinue bladder control medications after seeing urogynecology in mid July  [x]? Progressing: []? Met: []? Not Met: []? Adjusted            5.  Report using 3-4 behavioral modification strategies to reduce urinary/bowel complaints through dietary and mechanical changes, with focus on full emptying of bladder with each urination and using optimal positioning and deep breathing for relaxation of posterior PF muscles during defacation, reducing need to strain. - met - 6/28/22  [x]? Progressing: []? Met: []? Not Met: []? Adjusted            6. Rate severity of the problem related to urinary frequency/urgency/incontinence 3-4 or less on scale of 0-10 with 0 being no problem and 10 being the worst - (7-8/10 reported at [facility]). - 6/28/22 - met - rates at 4/10   []? Progressing: []? Met: []? Not Met: []? Adjusted            7. Perform HEP for pelvic floor and core muscle groups independently as she progresses to self-manage her pelvic pressure and PF muscle weakness and/or tightness. []? Progressing: []? Met: []? Not Met: []? Adjusted                           Plan:   Continue per plan of care   As needed review any stretches of hip/buttocks muscles. Continue focus of session to manual assessment and interventions to address tight tissues in PF and surrounding structures, including prevention of adhesions in vestibule from recurring. Anticipate discontinuing PF PT after next session if no adhesions seem to be forming. Patient will discuss discontinuing bladder medication with urogynecology at her next appointment on 7/14, the day following her next PF PT appointment. Frequency/Duration:     Patient is making progress toward goals but would continue to benefit from manual therapy to address tight tissues and progressing modification of movements and management of intraabdominal pressures with functional movements. Patient's next scheduled appointment is on 7/13 at 1PM.  Likely discontinue PF PT after next scheduled session.    Electronically signed by Ezequiel Toribio, PT #[id number] on 6/29/2022 at 12:18 PM    Note: If patient does not return for scheduled/recommended follow up visits, this note will serve as a discharge from care along with the most recent update on progress.

## 2022-06-29 ENCOUNTER — HOSPITAL ENCOUNTER (OUTPATIENT)
Dept: PHYSICAL THERAPY | Age: 87
Setting detail: THERAPIES SERIES
Discharge: HOME | End: 2022-06-29
Payer: MEDICARE

## 2022-06-29 PROCEDURE — 97110 THERAPEUTIC EXERCISES: CPT | Performed by: PHYSICAL THERAPIST

## 2022-06-29 PROCEDURE — 97530 THERAPEUTIC ACTIVITIES: CPT | Performed by: PHYSICAL THERAPIST

## 2022-06-29 PROCEDURE — 97140 MANUAL THERAPY 1/> REGIONS: CPT | Performed by: PHYSICAL THERAPIST

## 2022-07-12 ENCOUNTER — HOSPITAL ENCOUNTER (OUTPATIENT)
Dept: PHYSICAL THERAPY | Age: 87
Setting detail: THERAPIES SERIES
Discharge: HOME | End: 2022-07-12
Payer: MEDICARE

## 2022-07-12 PROCEDURE — 97110 THERAPEUTIC EXERCISES: CPT | Performed by: PHYSICAL THERAPIST

## 2022-07-12 PROCEDURE — 97140 MANUAL THERAPY 1/> REGIONS: CPT | Performed by: PHYSICAL THERAPIST

## 2022-07-12 PROCEDURE — 97530 THERAPEUTIC ACTIVITIES: CPT | Performed by: PHYSICAL THERAPIST

## 2022-07-12 NOTE — PROGRESS NOTES
[facility]  Phone: [phone]   Fax: [phone]      Physical Therapy Discharge Summary  Pelvic Floor Physical Therapy     Dear IKER Frazier CNP  ,     We had the pleasure of treating the following patient for physical therapy services at [address] [place] [address]. A summary of our findings can be found in the discharge summary below. If you have any questions or concerns regarding these findings, please do not hesitate to contact me at the office phone number above. Thank you for the referral.         Date: 2022    Patient: Laura Daley   : 3/18/1932   MRN: [id number]  Referring Physician:        Evaluation Date: 2022      Medical Diagnosis Information:  Diagnosis: R35.0- Urinary frequency; R39.15 - Urinary urgency                                             Insurance information: PT Insurance Information: Medicare    Functional Outcome:              PFDI-20 Score: 46.88  Sub-sections:  POPDI-6 Score : 0;  CRADI-8 Score : 9.38;  SASCHA-6 Score : 37.5                    Overall Response to Treatment:              [x]Patient is responding well to treatment and improvement is noted with regards  to goals              [x]Patient should continue to improve in reasonable time if they continue HEP              []Patient has plateaued and is no longer responding to skilled PT intervention                    []Patient is getting worse and would benefit from return to referring MD              []Patient unable to adhere to initial POC              []Other:      Date range of Visits: 3/22/22-22  Total Visits: 9     Recommendation:               [x] Discharge to [facility]. Follow up with PT or MD PRN. Please see last progress note below for discharge status when last seen on 22.          Electronically signed by:  Jennifer Melendez, PT #[id number] Physical Therapy  [facility] [place] [address]  Phone: [phone]  Fax: [phone]        Physical Therapy Daily Treatment Note    Date: 2022    Patient Name: Genevieve Mar : 3/18/1932 MRN: [id number]    Restrictions/Precautions:    Medical/Treatment Diagnosis Information:    · Diagnosis: R35.0- Urinary frequency; R39.15 - Urinary urgency    Insurance/Certification information: PT Insurance Information: Medicare    Physician Information:   IKER Green CNP      Plan of care signed (Y/N): Y  Cosigned by: IKER Green CNP at 3/23/2022  9:04 AM       Visit# / total visits: 9/10    Medicare Cap (if applicable):    $5,328.90  = total amount used, updated 2022    Time in: 1410  Time Out: 1520  Total Treatment Time: 70 minutes    Charges: Therapeutic Activity (21852)x2    Therapeutic Exercise (49691)x2    Manual (09589) - x1    Neuromuscular Re-ed (91803) - NA      ________________________________________________________________________________________    Pain Level: denies    SUBJECTIVE:   Daughter attended PF PT session with patient today in anticipation of patient's last session. Patient will follow up with urogynecolgy on Thursday, . Patient reports that she is still taking her bladder medications and will continue to do so at least until she sees IKER Green CNP later this week. But, patient continues to state that she wants to stop taking bladder control medications and is hopeful that implementing her behavioral modifications and performing her exercises are enough for adequate bladder control. Patient feels that her eye sight is getting worse, so definitely wants to get off bladder medication since believes this may be a possible side effect. Patient reports generally getting up only once a night - trying to limit fluids in the evening. Patient continues to report that rocking still helps to more fully empty the bladder. Typically urinating every ~2-3 hours. Occasional urinary leakage when gets up from sitting for several hours - urine leakage is contained in pad - usually happens about 2-3X/week. Patient tries to remember to exhale with exertional activities, including sit to stand, which is generally effective when she remembers to use this strategy. Patient reports less soreness after manual interventions after last session. Patient continuing to spread labia to better apply her cream to perineum. Patient reports that \"therapy helped me more than the pills. \"     OBJECTIVE:  Severity of problem: ~3-4/10 - still has some urine leakage when she stands up after sitting for longer periods of time - forgetful and does not always remember to exhale   Treatment Interventions Implemented  Exercise/Neuromuscular Re-education - Written HEP instructions provided and reviewed     Diaphragmatic breathing - coordinating with pelvic floor muscle activities - tactile cues on stomach - cues for PF descent with inhalation and educated patient to AVOID holding PF contraction     PF muscle control exercises -  supine - transvaginal feedback - QUICK FLICKS - 10 reps with cues to use for urge suppression and may practice for exercise ~2 times/day    Patient reports performing fairly regularly at home and without questions or concerns regarding following -   Happy baby - hold for 1 minute = 10 slow, deep breaths s 2-3 reps OR 2-3 minutes total  Hip add stretch - supine - hold 8-10 breaths/~1 minute x 2-3 reps each LE OR 2-3 minutes total   Piriformis stretch - figure four sitting - hold 8-10 breaths/~1 minute x 2 reps each LE - definitely prefers sitting stretch to supine since difficulty pulling thigh towards chest in supine - reviewed - cues for technique - continues to be tighter on R compared to L    Lateral thigh/piriformis stretch - one leg crossed over the other in supine -   hold for 1 minute = 10 slow, deep breaths x 2-3 reps    Posterior pelvic tilts - hold for 5-10 seconds x 10 reps - tactile and verbal cues to activate TA and to avoid clenching of PF muscles - reviewed - needed tactile, verbal and visual cues to perform correctly - cues to flex knees and hips in hooklying on supine - did best to place hands in lumbar arch and flatten to press into top of hands  Core stabilization activities - adding alternating shoulder flex, alternating hip flex, the alternating opposite hip/shoulder flex only x 10 reps while maintaining TA contraction with ppt. - cues for coordination of \"dead bug\" motion with tactile and verbal cues for contraction of TA/core for stabilization    Functional Mobility with increased awareness of decreasing demand on intraabdominal pressure - reports performing intermittently to assist with her balance  After initiating core stabilization with TA contraction, practiced sit to stand x 2 with TA contraction in preparation for sit to stand and then exhale during movement with no urinary leakage reported. Wall squats - hold 2-3 seconds, ensure TA contraction and exhale with pushing to standing upright - reviewed - no need to squat as deeply as causes R knee pain with later reps  Single leg stance with occasional UE support for ~ 10 second x 2 each LE- core stabilization more imbalanced on R foot due to decrease sensation  Hip add sets - seated - squeezing into fisted hands placed between knees x 10 reps, hold for 5 seconds each - overflow for gentle/automatic contraction of PF muscles    Hip flexor stretch with each LE over side of mat table - -  hold for 1 minute = 10 slow, deep breaths x 2-3 reps - having some difficulty finding effective surface to allow ample stretch - reviewed options for positions and various surfaces to attempt      Manual Interventions -   Cross friction massage to abdominal scars - 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   Ideapod prior to and during lifting activities and exhalation on exertion with all functional activities and exercises. Patient reported again that she sometimes continues to stands up to put shoes on and tie them while still standing - reviewed and emphasized safety concerns and increased pressure on her back and legs with this method - recommended patient SIT to don and tie her shoes which she reports doing \"sometimes when I remember. \"  Reviewed need to exhale with exertion - including when she pushing foot of recliner down prior to standing, then scoot forward and place feet back prior to exhaling during sit to stand. Reviewed benefits of manual interventions to assist with improve tissue mobility of vulvar tissue as well as PF muscles to improve flexibility of bladder and surrounding tissues thus decreasing bladder symptoms. Emphasized current combined benefits of medication and manual interventions working to improve elasticity of bladder and thus decrease her bladder symptoms. Educated patient and daughter to discuss plan with urogyn about follow up to monitor for reformation of any adhesions. If adhesion begin to reform, may need additional bout of PF PT to instruct in use of dilator to stretch tissues and limit adhesion reformation. Educated patient that she may need to separate tissue of labia when applying topical cream twice a week - use pelvic floor model to demonstrate use of one hand/several fingers to separate skin/tissue and other hand/finger to apply cream/ointment. Patient seems to be forming at least mild adhesions in superficial layers which are easily released with gentle stretching of surrounding tissues. Patient reports taking time to spread labia when applying cream to perineal area twice a week - noted less adhesions and fairly good vascularized tissue on R side vs L side of introitus. Educated in need to ensure ample coverage on cream on both sides.      ASSESSMENT:   Patient continues to report improvement in bladder control issues including infrequent urinary leakage, mostly when she holds her urine too long and then exerts herself and does not remember to exhale. She also reports getting up to urinate generally only once at night. Patient seems to be more consistent with exhaling during exertional activites but does report forgets on occasion. Patient has met most of her goals, improving severity of problem from 7-8/10 at initial eval to 3-4/10 at discharge. In addition disability index per PFDI-20 decreased from 95.83 at initial eval to 46.88 at discharge. Patient has follow up appointment with urogynecology later this week to discuss discontinuing her bladder control medication as she feels that she may be able to manage without the medication and does not appreciate the side effects. Patient able to tolerate external and transvaginal manual interventions. Addressed adhesions of clitoral jon and around urethral opening as well as superficial and deeper PF muscles which continue to demonstrate up to moderate tightness. Noted less adhesions forming in anterior superficial layers since patient more aware of need to separate these tissues well enough to apply medication in an effective manner. With transvaginal manual interventions, noted significant improvement in vascularization of tissue in L side of introitus which as initially white. Discontinue PF PT at this time. Treatment/Activity Tolerance:    Patient tolerated treatment fairly well   Patient responded well to external and transvaginal manual interventions - reported no pain or discomfort from manual techniques after last session and none at end of treatment session today. No adhesions noted. Patient more consistently spreading labia apart to apply cream to perineal area - this spreading on a regular basis should decrease tendency for adhesions to reform.    Noted increased vascularization of tissue in introitus after transvaginal manual interventions. Daughter present and aware of suggestions for follow up to ensure adhesions do not reform. GOALS:  Patient stated goal: Patient's Goal: \"to have more control over when I go to the bathroom\"and to not have to take bladder control medications - has more  Bladder control, hopeful to discontinue medications soon  [x]? Progressing: []? Met: []? Not Met: []? Adjusted   7/12/22 - Daughter present for discharge and reports that patient is going out and doing everything, which she was not prior to PF PT - feels that she is more socially active and more confident in her ability to control her urine.      Therapist goals for Patient:      Short Term Goals: To be achieved in: 4-5 sessions     1. Patient will have a decrease in urinary urgency, frequency, and incontinence to indicate improvement in pelvic floor strength and relaxation, muscle coordination, and/or bladder retraining.  []? Progressing: [x]? Met: []? Not Met: []? Adjusted  2. Perform HEP for pelvic floor and core muscle groups with minimal direction from therapist as she progresses to become more independent managing her pelvic pressure and PF muscle weakness and/or tightness. []? Progressing: [x]? Met: []? Not Met: []? Adjusted  3. Report using 1-2 behavioral modification strategies to reduce urinary/bowel complaints through dietary and mechanical changes, with focus on full emptying of bladder with each urination and using optimal positioning and deep breathing for relaxation of posterior PF muscles during defacation, reducing need to strain. []? Progressing: [x]? Met: []? Not Met: []? Adjusted     Long Term Goals: To be achieved in: 8-10 sessions     1.  Improve score of quality of life index measure, PFDI-20, to 80 or less (initial eval - 95.83) disability index to assist with reaching prior level of function and demonstrating improved quality of life with less life interruptions due to urinary urgency, frequency, and incontinence allowing patient to fully participate in socially appropriate activities, including bending and lifting for homemaking chores. - met - 7/12/22 - 46.88 disability index at discharge  []? Progressing: [x]? Met: []? Not Met: []? Adjusted  2. Patient will report using 1 or less pads for urinary protection to progress towards completing ADLs and recreational activities without leakage. (using about 3 pads/day at initial eval) - 6/28 - partially met -  wearing about 2 pads/day - change when she goes out and may change more frequently than necessary - reports \"definitely using less pads\" - 7/12/22 - sometimes changes more often to avoid infection  [x]? Progressing: []? Met: []? Not Met: []? Adjusted  3. Patient will return to functional activities including bending/lifting and transferring sit to stand without increased symptoms/decrased urinary leakage. - met - 6/28/22  []? Progressing: [x]? Met: []? Not Met: []? Adjusted  4. Patient will be able to control her bladder with less urinary urgency, frequency and incontinence without the need for continued medication for bladder control issues. - hopeful to discontinue bladder control medications after seeing urogynecology after appointment on 7/14/22  [x]? Progressing: []? Met: []? Not Met: []? Adjusted            5. Report using 3-4 behavioral modification strategies to reduce urinary/bowel complaints through dietary and mechanical changes, with focus on full emptying of bladder with each urination and using optimal positioning and deep breathing for relaxation of posterior PF muscles during defacation, reducing need to strain. - met - 6/28/22 and 7/12/22  [x]? Progressing: []? Met: []? Not Met: []? Adjusted            6. Rate severity of the problem related to urinary frequency/urgency/incontinence 3-4 or less on scale of 0-10 with 0 being no problem and 10 being the worst - (7-8/10 reported at intial eval).  - 6/28/22 - met - rates at 4/10 - 7/12/22 - rates at 3-4/10  []? Progressing: [x]? Met: []? Not Met: []? Adjusted            7. Perform HEP for pelvic floor and core muscle groups independently as she progresses to self-manage her pelvic pressure and PF muscle weakness and/or tightness. []? Progressing: [x]? Met: []? Not Met: []? Adjusted                           Plan:   Discontinue PF PT. Follow up with urogynecology on 7/14 - will discuss  discontinuing bladder control medications. Electronically signed by Cristofer Lr, PT #[id number] on 7/12/2022 at 9:42 AM    Note: If patient does not return for scheduled/recommended follow up visits, this note will serve as a discharge from care along with the most recent update on progress.

## 2022-07-14 ENCOUNTER — OFFICE VISIT (OUTPATIENT)
Dept: UROGYNECOLOGY | Age: 87
End: 2022-07-14
Payer: MEDICARE

## 2022-07-14 VITALS
RESPIRATION RATE: 14 BRPM | HEART RATE: 78 BPM | TEMPERATURE: 98 F | DIASTOLIC BLOOD PRESSURE: 73 MMHG | SYSTOLIC BLOOD PRESSURE: 143 MMHG | OXYGEN SATURATION: 98 %

## 2022-07-14 DIAGNOSIS — R35.0 URINARY FREQUENCY: ICD-10-CM

## 2022-07-14 DIAGNOSIS — L90.0 LICHEN SCLEROSUS ET ATROPHICUS: ICD-10-CM

## 2022-07-14 DIAGNOSIS — N95.2 VAGINAL ATROPHY: Primary | ICD-10-CM

## 2022-07-14 DIAGNOSIS — R39.15 URINARY URGENCY: ICD-10-CM

## 2022-07-14 DIAGNOSIS — N39.41 URGE INCONTINENCE: ICD-10-CM

## 2022-07-14 PROCEDURE — 99213 OFFICE O/P EST LOW 20 MIN: CPT | Performed by: NURSE PRACTITIONER

## 2022-07-14 PROCEDURE — 1123F ACP DISCUSS/DSCN MKR DOCD: CPT | Performed by: NURSE PRACTITIONER

## 2022-07-14 PROCEDURE — G8427 DOCREV CUR MEDS BY ELIG CLIN: HCPCS | Performed by: NURSE PRACTITIONER

## 2022-07-14 PROCEDURE — 1090F PRES/ABSN URINE INCON ASSESS: CPT | Performed by: NURSE PRACTITIONER

## 2022-07-14 PROCEDURE — 0509F URINE INCON PLAN DOCD: CPT | Performed by: NURSE PRACTITIONER

## 2022-07-14 PROCEDURE — 1036F TOBACCO NON-USER: CPT | Performed by: NURSE PRACTITIONER

## 2022-07-14 PROCEDURE — G8420 CALC BMI NORM PARAMETERS: HCPCS | Performed by: NURSE PRACTITIONER

## 2022-07-14 RX ORDER — ESTRADIOL 0.1 MG/G
0.25 CREAM VAGINAL DAILY
Qty: 30 G | Refills: 0 | Status: SHIPPED | OUTPATIENT
Start: 2022-07-14

## 2022-07-14 NOTE — PROGRESS NOTES
2022       HPI:     Name: Genevieve Mar  YOB: 1932    CC: Patient is a 80 y.o. presenting for evaluation of PFPT. HPI: How long have you had this problem? Several years  Please rate the severity of your problem: improving with PFPT  Anything make it better? PFPT, uncertain if Myrbetriq does anything    Notes from PFPT visit 22: Patient reports generally getting up only once a night - trying to limit fluids in the evening. Typically urinating every ~2-3 hours. Occasional urinary leakage when gets up from sitting for several hours - urine leakage is contained in pad - usually happens about 2-3X/week. Patient tries to remember to exhale with exertional activities, including sit to stand, which is generally effective when she remembers to use this strategy.      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        Ob/Gyn History:    OB History    Para Term  AB Living   6 4 4   2 4   SAB IAB Ectopic Molar Multiple Live Births   2         4      # Outcome Date GA Lbr Nick/2nd Weight Sex Delivery Anes PTL Lv   6 Term      Vag-Spont   DALE   5 Term      Vag-Spont   DALE   4 Term      Vag-Spont   DALE   3 SAB         FD   2 SAB         FD   1 Term      Vag-Spont   DALE     Past Medical History:   Past Medical History:   Diagnosis Date    Constipation     OCCAS    Frequent urination 02/15/2022    Frequent urination 2022    Glaucoma     Loss of bladder control 2022    Thyroid disease     NO MEDS-BORDERLINE    Urinary leakage      Past Surgical History:   Past Surgical History:   Procedure Laterality Date    ANTERIOR CRUCIATE LIGAMENT REPAIR Right     APPENDECTOMY      WITH COLON SURGERY    CHOLECYSTECTOMY      COLON SURGERY      COLON RUPTURE WITH COLOSTOMY    COLONOSCOPY      SEVERAL    EYE SURGERY      cataract    OTHER SURGICAL HISTORY      COLOSTOMY REVERSAL    OTHER SURGICAL HISTORY      HEMTOMA-ABOVE FOREHEAD AFTER FALL SURGERY-I&D    VULVAR/PERINEAL BIOPSY N/A 1/31/2022    VULVAR BIOPSY AND SEPARATION OF VULVA, CYSTOSCOPY performed by Donna Barber MD at [facility]: Allergies   Allergen Reactions    Codeine Nausea Only and Nausea And Vomiting     \"feels like I'm going to pass out\"    Morphine And Related Nausea And Vomiting and Other (See Comments)     \"passing out\"       Current Medications:  Current Outpatient Medications   Medication Sig Dispense Refill    estradiol (ESTRACE VAGINAL) 0.1 MG/GM vaginal cream Place 0.25 g vaginally daily Apply 0.25g with fingertip to the vaginal opening every night at bedtime for 2 weeks, then decrease to twice weekly. 30 g 0    mirabegron (MYRBETRIQ) 25 MG TB24 Take 1 tablet by mouth daily 30 tablet 1    clobetasol (TEMOVATE) 0.05 % ointment APPLY VAGINALLY TWICE DAILY FOR 5 WEEKS, THEN EVERY OTHER DAY FOR 2 WEEKS, THEN JUST 1 TO 2 TIMES WEEKLY FOR MAINTENANCE 45 g 2    clobetasol (TEMOVATE) 0.05 % cream APPLY VAGINALLY TWICE DAILY FOR 5 WEEKS, THEN EVERY OTHER DAY FOR 2 WEEKS, THEN JUST 1 TO 2 TIMES WEEKLY FOR MAINTENANCE      clobetasol prop emollient base 0.05 % CREA Apply vaginally twice daily for 5 weeks, then every other day for 2 weeks, then just 1 to 2 times weekly for maintenance.  45 g 2    Biotin 1000 MCG TABS Take 1 tablet by mouth daily      Cholecalciferol (VITAMIN D3) 125 MCG (5000 UT) TABS Take 1 tablet by mouth daily      Krill Oil (OMEGA-3) 500 MG CAPS Take 1 tablet by mouth daily 4 IN 1      Multiple Vitamins-Minerals (CENTRUM SILVER PO) Take 1 tablet by mouth daily      senna-docusate (PERICOLACE) 8.6-50 MG per tablet Take 1 tablet by mouth daily as needed      psyllium (METAMUCIL) 58.6 % packet Take 1 packet by mouth as needed       ROCKLATAN 0.02-0.005 % SOLN Take 1 drop by mouth at bedtime       UNABLE TO FIND Take 1 tablet by mouth daily Osteo biflex -TRIPLE STRENGTH      aspirin 81 MG chewable tablet Take 81 mg by mouth nightly       Ascorbic Acid (VITAMIN C) 1000 MG tablet Take 1,000 mg by mouth daily       pyridoxine (B-6) 100 MG tablet Take 100 mg by mouth daily      latanoprost (XALATAN) 0.005 % ophthalmic solution Place 1 drop into the right eye nightly       cyanocobalamin 1000 MCG tablet Take 1,000 mcg by mouth daily      dorzolamide-timolol (COSOPT) 22.3-6.8 MG/ML ophthalmic solution Place 1 drop into both eyes 2 times daily       pilocarpine (PILOCAR) 2 % ophthalmic solution Place 1 drop into the right eye 2 times daily       Multiple Vitamins-Minerals (VITEYES AREDS FORMULA) CAPS Take 1 capsule by mouth daily        No current facility-administered medications for this visit. Social History:   Social History     Socioeconomic History    Marital status:      Spouse name: Not on file    Number of children: Not on file    Years of education: Not on file    Highest education level: Not on file   Occupational History    Not on file   Tobacco Use    Smoking status: Never Smoker    Smokeless tobacco: Never Used   Vaping Use    Vaping Use: Never used   Substance and Sexual Activity    Alcohol use: Yes     Comment: occassionally    Drug use: Never    Sexual activity: Not Currently   Other Topics Concern    Not on file   Social History Narrative    Not on file     Social Determinants of Health     Financial Resource Strain:     Difficulty of Paying Living Expenses: Not on file   Food Insecurity:     Worried About Running Out of Food in the Last Year: Not on file    Sami of Food in the Last Year: Not on file   Transportation Needs:     Lack of Transportation (Medical): Not on file    Lack of Transportation (Non-Medical):  Not on file   Physical Activity:     Days of Exercise per Week: Not on file    Minutes of Exercise per Session: Not on file   Stress:     Feeling of Stress : Not on file   Social Connections:     Frequency of Communication with Friends

## 2022-07-19 ENCOUNTER — TELEPHONE (OUTPATIENT)
Dept: UROGYNECOLOGY | Age: 87
End: 2022-07-19

## 2022-07-19 NOTE — TELEPHONE ENCOUNTER
Patient called the office stating that [facility] needs more information on the instructions for the estradiol vaginal cream that [facility] ordered. They will not give her this yet. Their number is [phone].

## 2022-07-19 NOTE — TELEPHONE ENCOUNTER
Called pharmacy and clarified instructions, patients medication able to be dispensed now. Notified patient and she was thankful for call. No other needs at this time.

## 2022-07-22 ENCOUNTER — TELEPHONE (OUTPATIENT)
Dept: UROGYNECOLOGY | Age: 87
End: 2022-07-22

## 2022-07-22 NOTE — TELEPHONE ENCOUNTER
Called asking about side effects of estrogen. RN explained that we are using such a small amount on the vaginal tissues that most/all of those side effects dont apply. They are for systemic use. She is unsure if she wants to use the cream or not. RN voiced understanding.

## 2023-01-11 ENCOUNTER — OFFICE VISIT (OUTPATIENT)
Dept: UROGYNECOLOGY | Age: 88
End: 2023-01-11
Payer: MEDICARE

## 2023-01-11 ENCOUNTER — TELEPHONE (OUTPATIENT)
Dept: UROGYNECOLOGY | Age: 88
End: 2023-01-11

## 2023-01-11 VITALS
TEMPERATURE: 97.8 F | RESPIRATION RATE: 16 BRPM | SYSTOLIC BLOOD PRESSURE: 160 MMHG | HEART RATE: 71 BPM | OXYGEN SATURATION: 96 % | DIASTOLIC BLOOD PRESSURE: 68 MMHG

## 2023-01-11 DIAGNOSIS — L90.0 LICHEN SCLEROSUS ET ATROPHICUS: Primary | ICD-10-CM

## 2023-01-11 DIAGNOSIS — N95.2 VAGINAL ATROPHY: ICD-10-CM

## 2023-01-11 DIAGNOSIS — R35.0 URINARY FREQUENCY: ICD-10-CM

## 2023-01-11 DIAGNOSIS — R39.15 URINARY URGENCY: ICD-10-CM

## 2023-01-11 DIAGNOSIS — N39.41 URGE INCONTINENCE: ICD-10-CM

## 2023-01-11 PROCEDURE — 0509F URINE INCON PLAN DOCD: CPT | Performed by: OBSTETRICS & GYNECOLOGY

## 2023-01-11 PROCEDURE — 1036F TOBACCO NON-USER: CPT | Performed by: OBSTETRICS & GYNECOLOGY

## 2023-01-11 PROCEDURE — G8484 FLU IMMUNIZE NO ADMIN: HCPCS | Performed by: OBSTETRICS & GYNECOLOGY

## 2023-01-11 PROCEDURE — G8427 DOCREV CUR MEDS BY ELIG CLIN: HCPCS | Performed by: OBSTETRICS & GYNECOLOGY

## 2023-01-11 PROCEDURE — 1090F PRES/ABSN URINE INCON ASSESS: CPT | Performed by: OBSTETRICS & GYNECOLOGY

## 2023-01-11 PROCEDURE — G8420 CALC BMI NORM PARAMETERS: HCPCS | Performed by: OBSTETRICS & GYNECOLOGY

## 2023-01-11 PROCEDURE — 1123F ACP DISCUSS/DSCN MKR DOCD: CPT | Performed by: OBSTETRICS & GYNECOLOGY

## 2023-01-11 PROCEDURE — 99214 OFFICE O/P EST MOD 30 MIN: CPT | Performed by: OBSTETRICS & GYNECOLOGY

## 2023-01-11 NOTE — PROGRESS NOTES
2023       HPI:     Name: June Regan  YOB: 1932    CC: Patient is a 80 y.o. presenting for evaluation of  urinary urge incontinence and Lichens sclerosis . HPI: How long have you had this problem? years  Please rate the severity of your problem: moderate  Anything make it better? Per patient her urinary urgency is not better at this time. Ob/Gyn History:    OB History    Para Term  AB Living   6 4 4   2 4   SAB IAB Ectopic Molar Multiple Live Births   2         4      # Outcome Date GA Lbr Nick/2nd Weight Sex Delivery Anes PTL Lv   6 Term      Vag-Spont   DALE   5 Term      Vag-Spont   DALE   4 Term      Vag-Spont   DALE   3 SAB         FD   2 SAB         FD   1 Term      Vag-Spont   DALE     Past Medical History:   Past Medical History:   Diagnosis Date    Constipation     OCCAS    Frequent urination 02/15/2022    Frequent urination 2022    Glaucoma     Loss of bladder control 2022    Thyroid disease     NO MEDS-BORDERLINE    Urinary leakage      Past Surgical History:   Past Surgical History:   Procedure Laterality Date    ANTERIOR CRUCIATE LIGAMENT REPAIR Right     APPENDECTOMY      WITH COLON SURGERY    CHOLECYSTECTOMY      COLON SURGERY      COLON RUPTURE WITH COLOSTOMY    COLONOSCOPY      SEVERAL    EYE SURGERY      cataract    OTHER SURGICAL HISTORY      COLOSTOMY REVERSAL    OTHER SURGICAL HISTORY      HEMTOMA-ABOVE FOREHEAD AFTER FALL SURGERY-I&D    VULVAR/PERINEAL BIOPSY N/A 2022    VULVAR BIOPSY AND SEPARATION OF VULVA, CYSTOSCOPY performed by Johnathon Fisher MD at [address] [place] [address]:    Allergies   Allergen Reactions    Codeine Nausea Only and Nausea And Vomiting     \"feels like I'm going to pass out\"    Morphine And Related Nausea And Vomiting and Other (See Comments)     \"passing out\"       Current Medications:  Current Outpatient Medications   Medication Sig Dispense Refill    estradiol (ESTRACE VAGINAL) 0.1 MG/GM vaginal cream Place 0.25 g vaginally daily Apply 0.25g with fingertip to the vaginal opening every night at bedtime for 2 weeks, then decrease to twice weekly.  30 g 0    clobetasol (TEMOVATE) 0.05 % cream APPLY VAGINALLY TWICE DAILY FOR 5 WEEKS, THEN EVERY OTHER DAY FOR 2 WEEKS, THEN JUST 1 TO 2 TIMES WEEKLY FOR MAINTENANCE      Biotin 1000 MCG TABS Take 1 tablet by mouth daily      Cholecalciferol (VITAMIN D3) 125 MCG (5000 UT) TABS Take 1 tablet by mouth daily      Krill Oil (OMEGA-3) 500 MG CAPS Take 1 tablet by mouth daily 4 IN 1      Multiple Vitamins-Minerals (CENTRUM SILVER PO) Take 1 tablet by mouth daily      senna-docusate (PERICOLACE) 8.6-50 MG per tablet Take 1 tablet by mouth daily as needed      psyllium (METAMUCIL) 58.6 % packet Take 1 packet by mouth as needed       ROCKLATAN 0.02-0.005 % SOLN Take 1 drop by mouth at bedtime       UNABLE TO FIND Take 1 tablet by mouth daily Osteo biflex -TRIPLE STRENGTH      aspirin 81 MG chewable tablet Take 81 mg by mouth nightly       Ascorbic Acid (VITAMIN C) 1000 MG tablet Take 1,000 mg by mouth daily       pyridoxine (B-6) 100 MG tablet Take 100 mg by mouth daily      latanoprost (XALATAN) 0.005 % ophthalmic solution Place 1 drop into the right eye nightly       cyanocobalamin 1000 MCG tablet Take 1,000 mcg by mouth daily      dorzolamide-timolol (COSOPT) 22.3-6.8 MG/ML ophthalmic solution Place 1 drop into both eyes 2 times daily       pilocarpine (PILOCAR) 2 % ophthalmic solution Place 1 drop into the right eye 2 times daily       Multiple Vitamins-Minerals (VITEYES AREDS FORMULA) CAPS Take 1 capsule by mouth daily       mirabegron (MYRBETRIQ) 25 MG TB24 Take 1 tablet by mouth daily (Patient not taking: Reported on 1/11/2023) 30 tablet 1    clobetasol (TEMOVATE) 0.05 % ointment APPLY VAGINALLY TWICE DAILY FOR 5 WEEKS, THEN EVERY OTHER DAY FOR 2 WEEKS, THEN JUST 1 TO 2 TIMES WEEKLY FOR MAINTENANCE 45 g 2    clobetasol prop emollient base 0.05 % CREA Apply vaginally twice daily for 5 weeks, then every other day for 2 weeks, then just 1 to 2 times weekly for maintenance. 45 g 2     No current facility-administered medications for this visit. Social History:   Social History     Socioeconomic History    Marital status:      Spouse name: Not on file    Number of children: Not on file    Years of education: Not on file    Highest education level: Not on file   Occupational History    Not on file   Tobacco Use    Smoking status: Never    Smokeless tobacco: Never   Vaping Use    Vaping Use: Never used   Substance and Sexual Activity    Alcohol use: Yes     Comment: occassionally    Drug use: Never    Sexual activity: Not Currently   Other Topics Concern    Not on file   Social History Narrative    Not on file     Social Determinants of Health     Financial Resource Strain: Not on file   Food Insecurity: Not on file   Transportation Needs: Not on file   Physical Activity: Not on file   Stress: Not on file   Social Connections: Not on file   Intimate Partner Violence: Not on file   Housing Stability: Not on file     Family History:   Family History   Problem Relation Age of Onset    Lung Cancer Brother     Heart Attack Brother     Diabetes Brother     Stroke Brother     Emphysema Father     Cancer Brother     Lung Cancer Brother          Objective:     Vitals  Vitals:    01/11/23 1207 01/11/23 1253   BP: (!) 182/84 (!) 160/68   Site:  Right Upper Arm   Position:  Sitting   Pulse: 71    Resp: 16    Temp: 97.8 °F (36.6 °C)    SpO2: 96%      Physical Exam  Physical Exam  HENT:      Head: Normocephalic and atraumatic. Eyes:      Conjunctiva/sclera: Conjunctivae normal.   Pulmonary:      Effort: Pulmonary effort is normal.   Abdominal:      Palpations: Abdomen is soft. Musculoskeletal:      Cervical back: Normal range of motion and neck supple. Skin:     General: Skin is warm and dry. Neurological:      Mental Status: She is alert and oriented to person, place, and time.        No results found for this visit on 01/11/23. Assessment/Plan:     Mitchell Benavides is a 80 y.o. female with   1. Lichen sclerosus et atrophicus    2. Vaginal atrophy    3. Urinary frequency    4. Urinary urgency    5. Urge incontinence    Old records reviewed, outside records reviewed  Preston Santoro presents today as a follow-up. She was previously treated with clobetasol for lichen sclerosus and continues to use this twice a week. She also is using estrogen cream twice a week and we reviewed how to appropriately apply this. She is not taking the Myrbetriq that was prescribed and is complaining of some urgency and frequency and some nocturia. She states that she does not want to take any pills. She had good luck with pelvic floor physical therapy in the past and I suggested that she start with this on her own if this does not work that she switched to trying the Myrbetriq and if that does not work we can talk about Botox injections. Her daughter was with her today and was worried about a urinary tract infection because of some back pain. The patient does not feel like she has a urinary tract infection. Her back pain is mid back high lower than her kidneys but above her lumbar area. I told her that I was uncertain what this is and that I would follow-up with the primary care physician. She will follow back up with us in approximately 4 months time. No orders of the defined types were placed in this encounter. No orders of the defined types were placed in this encounter.       Marc Jones MD

## 2023-01-12 ENCOUNTER — TELEPHONE (OUTPATIENT)
Dept: UROGYNECOLOGY | Age: 88
End: 2023-01-12

## 2023-01-12 ENCOUNTER — OFFICE VISIT (OUTPATIENT)
Dept: UROGYNECOLOGY | Age: 88
End: 2023-01-12

## 2023-01-12 VITALS
DIASTOLIC BLOOD PRESSURE: 78 MMHG | TEMPERATURE: 97.5 F | OXYGEN SATURATION: 99 % | SYSTOLIC BLOOD PRESSURE: 149 MMHG | RESPIRATION RATE: 14 BRPM | HEART RATE: 82 BPM

## 2023-01-12 DIAGNOSIS — L90.0 LICHEN SCLEROSUS ET ATROPHICUS: Primary | ICD-10-CM

## 2023-01-12 DIAGNOSIS — N95.2 VAGINAL ATROPHY: ICD-10-CM

## 2023-01-12 NOTE — TELEPHONE ENCOUNTER
Patient called and states she is concerned because her vaginal opening is closed. She states she can void \"just fine\" but she has been using all the creams and it is still closed. She states she called yesterday with this concern after being seen by Dr. Ramy Damian and felt like she \"could get my finger there but today it is closed. \" Informed patient I will place telephone message and a nurse will call her back. Patient grateful. Please advise.

## 2023-01-12 NOTE — TELEPHONE ENCOUNTER
MD contacted, patient scheduled for a follow up with nurse practitioner today to check vaginal opening. Patient verbalized understanding to appointment details.

## 2023-01-12 NOTE — PROGRESS NOTES
2023       HPI:     Name: Jose Fung  YOB: 1932    CC: Patient is a 80 y.o. presenting for evaluation of  vaginal opening . Patient has Lichens Sclerosis and urge incontinence. HPI: How long have you had this problem? years  Please rate the severity of your problem: moderate  Anything make it better? HX: VULVAR BIOPSY AND SEPARATION OF VULVA, CYSTOSCOPY on 22    Wants to make sure her vaginal opening has not closed, concerned that it did and [facility] Ob/Gyn History:    OB History    Para Term  AB Living   6 4 4   2 4   SAB IAB Ectopic Molar Multiple Live Births   2         4      # Outcome Date GA Lbr Nick/2nd Weight Sex Delivery Anes PTL Lv   6 Term      Vag-Spont   DALE   5 Term      Vag-Spont   DALE   4 Term      Vag-Spont   DALE   3 SAB         FD   2 SAB         FD   1 Term      Vag-Spont   DALE     Past Medical History:   Past Medical History:   Diagnosis Date    Constipation     OCCAS    Frequent urination 02/15/2022    Frequent urination 2022    Glaucoma     Loss of bladder control 2022    Thyroid disease     NO MEDS-BORDERLINE    Urinary leakage      Past Surgical History:   Past Surgical History:   Procedure Laterality Date    ANTERIOR CRUCIATE LIGAMENT REPAIR Right     APPENDECTOMY      WITH COLON SURGERY    CHOLECYSTECTOMY      COLON SURGERY      COLON RUPTURE WITH COLOSTOMY    COLONOSCOPY      SEVERAL    EYE SURGERY      cataract    OTHER SURGICAL HISTORY      COLOSTOMY REVERSAL    OTHER SURGICAL HISTORY      HEMTOMA-ABOVE FOREHEAD AFTER FALL SURGERY-I&D    VULVAR/PERINEAL BIOPSY N/A 2022    VULVAR BIOPSY AND SEPARATION OF VULVA, CYSTOSCOPY performed by Teresa Bang MD at [facility]:    Allergies   Allergen Reactions    Codeine Nausea Only and Nausea And Vomiting     \"feels like I'm going to pass out\"    Morphine And Related Nausea And Vomiting and Other (See Comments)     \"passing out\" Current Medications:  Current Outpatient Medications   Medication Sig Dispense Refill    estradiol (ESTRACE VAGINAL) 0.1 MG/GM vaginal cream Place 0.25 g vaginally daily Apply 0.25g with fingertip to the vaginal opening every night at bedtime for 2 weeks, then decrease to twice weekly. 30 g 0    mirabegron (MYRBETRIQ) 25 MG TB24 Take 1 tablet by mouth daily (Patient not taking: Reported on 1/11/2023) 30 tablet 1    clobetasol (TEMOVATE) 0.05 % ointment APPLY VAGINALLY TWICE DAILY FOR 5 WEEKS, THEN EVERY OTHER DAY FOR 2 WEEKS, THEN JUST 1 TO 2 TIMES WEEKLY FOR MAINTENANCE 45 g 2    clobetasol (TEMOVATE) 0.05 % cream APPLY VAGINALLY TWICE DAILY FOR 5 WEEKS, THEN EVERY OTHER DAY FOR 2 WEEKS, THEN JUST 1 TO 2 TIMES WEEKLY FOR MAINTENANCE      clobetasol prop emollient base 0.05 % CREA Apply vaginally twice daily for 5 weeks, then every other day for 2 weeks, then just 1 to 2 times weekly for maintenance.  45 g 2    Biotin 1000 MCG TABS Take 1 tablet by mouth daily      Cholecalciferol (VITAMIN D3) 125 MCG (5000 UT) TABS Take 1 tablet by mouth daily      Krill Oil (OMEGA-3) 500 MG CAPS Take 1 tablet by mouth daily 4 IN 1      Multiple Vitamins-Minerals (CENTRUM SILVER PO) Take 1 tablet by mouth daily      senna-docusate (PERICOLACE) 8.6-50 MG per tablet Take 1 tablet by mouth daily as needed      psyllium (METAMUCIL) 58.6 % packet Take 1 packet by mouth as needed       ROCKLATAN 0.02-0.005 % SOLN Take 1 drop by mouth at bedtime       UNABLE TO FIND Take 1 tablet by mouth daily Osteo biflex -TRIPLE STRENGTH      aspirin 81 MG chewable tablet Take 81 mg by mouth nightly       Ascorbic Acid (VITAMIN C) 1000 MG tablet Take 1,000 mg by mouth daily       pyridoxine (B-6) 100 MG tablet Take 100 mg by mouth daily      latanoprost (XALATAN) 0.005 % ophthalmic solution Place 1 drop into the right eye nightly       cyanocobalamin 1000 MCG tablet Take 1,000 mcg by mouth daily      dorzolamide-timolol (COSOPT) 22.3-6.8 MG/ML ophthalmic solution Place 1 drop into both eyes 2 times daily       pilocarpine (PILOCAR) 2 % ophthalmic solution Place 1 drop into the right eye 2 times daily       Multiple Vitamins-Minerals (VITEYES AREDS FORMULA) CAPS Take 1 capsule by mouth daily        No current facility-administered medications for this visit. Social History:   Social History     Socioeconomic History    Marital status:      Spouse name: Not on file    Number of children: Not on file    Years of education: Not on file    Highest education level: Not on file   Occupational History    Not on file   Tobacco Use    Smoking status: Never    Smokeless tobacco: Never   Vaping Use    Vaping Use: Never used   Substance and Sexual Activity    Alcohol use: Yes     Comment: occassionally    Drug use: Never    Sexual activity: Not Currently   Other Topics Concern    Not on file   Social History Narrative    Not on file     Social Determinants of Health     Financial Resource Strain: Not on file   Food Insecurity: Not on file   Transportation Needs: Not on file   Physical Activity: Not on file   Stress: Not on file   Social Connections: Not on file   Intimate Partner Violence: Not on file   Housing Stability: Not on file     Family History:   Family History   Problem Relation Age of Onset    Lung Cancer Brother     Heart Attack Brother     Diabetes Brother     Stroke Brother     Emphysema Father     Cancer Brother     Lung Cancer Brother          Objective:     Vitals  Vitals:    01/12/23 1427 01/12/23 1432 01/12/23 1507   BP: (!) 189/84 (!) 188/96 (!) 149/78   Pulse: 82     Resp: 14     Temp: 97.5 °F (36.4 °C)     SpO2: 99%       Physical Exam  Physical Exam  Genitourinary:     Comments: Patient reassured of vaginal opening present        No results found for this visit on 01/12/23. Assessment/Plan:     Beverly De Leon is a 80 y.o. female with   1. Lichen sclerosus et atrophicus    2.  Vaginal atrophy    -Lichen sclerosus  - Patient reassured vaginal opening is present. Continue creams as instructed. Phone call placed to her daughter Irwin Ty with patient consent to relay information    She will call sukhdeep for her PFPT visit as referred yesterday  IKER Spencer CNP    No orders of the defined types were placed in this encounter. No orders of the defined types were placed in this encounter.       IKER Spencer - CNP

## 2023-05-11 ENCOUNTER — OFFICE VISIT (OUTPATIENT)
Dept: UROGYNECOLOGY | Age: 88
End: 2023-05-11
Payer: COMMERCIAL

## 2023-05-11 VITALS
OXYGEN SATURATION: 98 % | TEMPERATURE: 97.7 F | DIASTOLIC BLOOD PRESSURE: 80 MMHG | SYSTOLIC BLOOD PRESSURE: 149 MMHG | HEART RATE: 69 BPM | RESPIRATION RATE: 16 BRPM

## 2023-05-11 DIAGNOSIS — L90.0 LICHEN SCLEROSUS ET ATROPHICUS: Primary | ICD-10-CM

## 2023-05-11 DIAGNOSIS — N95.2 VAGINAL ATROPHY: ICD-10-CM

## 2023-05-11 PROCEDURE — 99212 OFFICE O/P EST SF 10 MIN: CPT | Performed by: NURSE PRACTITIONER

## 2023-05-11 PROCEDURE — G8427 DOCREV CUR MEDS BY ELIG CLIN: HCPCS | Performed by: NURSE PRACTITIONER

## 2023-05-11 PROCEDURE — 1123F ACP DISCUSS/DSCN MKR DOCD: CPT | Performed by: NURSE PRACTITIONER

## 2023-05-11 PROCEDURE — 1036F TOBACCO NON-USER: CPT | Performed by: NURSE PRACTITIONER

## 2023-05-11 PROCEDURE — G8421 BMI NOT CALCULATED: HCPCS | Performed by: NURSE PRACTITIONER

## 2023-05-11 PROCEDURE — 1090F PRES/ABSN URINE INCON ASSESS: CPT | Performed by: NURSE PRACTITIONER

## 2023-05-11 RX ORDER — LISINOPRIL 5 MG/1
5 TABLET ORAL DAILY
COMMUNITY
Start: 2023-01-30

## 2023-05-11 NOTE — PROGRESS NOTES
2023       HPI:     Name: Romayne Maxon  YOB: 1932    CC: Patient is a 80 y.o. presenting for evaluation of patient has Lichens Sclerosis and urge incontinence. HPI: How long have you had this problem? years  Please rate the severity of your problem: moderate  Anything make it better? Patient reports she does experience urge incontinence. She is still using her creams for lichens sclerosis and thinks that is going well at this time. HX: VULVAR BIOPSY AND SEPARATION OF VULVA, CYSTOSCOPY on 22    Continues to use her clobetasol and estrogen  No difficulty with urination. Ob/Gyn History:    OB History    Para Term  AB Living   6 4 4   2 4   SAB IAB Ectopic Molar Multiple Live Births   2         4      # Outcome Date GA Lbr Nick/2nd Weight Sex Delivery Anes PTL Lv   6 Term      Vag-Spont   DLAE   5 Term      Vag-Spont   DALE   4 Term      Vag-Spont   DALE   3 SAB         FD   2 SAB         FD   1 Term      Vag-Spont   DALE     Past Medical History:   Past Medical History:   Diagnosis Date    Constipation     OCCAS    Frequent urination 02/15/2022    Frequent urination 2022    Glaucoma     Loss of bladder control 2022    Thyroid disease     NO MEDS-BORDERLINE    Urinary leakage      Past Surgical History:   Past Surgical History:   Procedure Laterality Date    ANTERIOR CRUCIATE LIGAMENT REPAIR Right     APPENDECTOMY      WITH COLON SURGERY    CHOLECYSTECTOMY      COLON SURGERY      COLON RUPTURE WITH COLOSTOMY    COLONOSCOPY      SEVERAL    EYE SURGERY      cataract    OTHER SURGICAL HISTORY      COLOSTOMY REVERSAL    OTHER SURGICAL HISTORY      HEMTOMA-ABOVE FOREHEAD AFTER FALL SURGERY-I&D    VULVAR/PERINEAL BIOPSY N/A 2022    VULVAR BIOPSY AND SEPARATION OF VULVA, CYSTOSCOPY performed by Albania Martel MD at [address] [place] [address]:    Allergies   Allergen Reactions    Codeine Nausea Only and Nausea And Vomiting     \"feels like I'm going to pass out\"

## 2023-10-23 ENCOUNTER — CLINICAL DOCUMENTATION (OUTPATIENT)
Dept: UROGYNECOLOGY | Age: 88
End: 2023-10-23

## 2023-10-23 NOTE — PROGRESS NOTES
Received prescription refill request from [address] [place] [address]. Approved refill for estrogen cream and clobetasol at this time. Patient scheduled for follow up next month.

## 2023-11-14 ENCOUNTER — OFFICE VISIT (OUTPATIENT)
Dept: UROGYNECOLOGY | Age: 88
End: 2023-11-14
Payer: MEDICARE

## 2023-11-14 VITALS
TEMPERATURE: 97.4 F | OXYGEN SATURATION: 98 % | RESPIRATION RATE: 16 BRPM | DIASTOLIC BLOOD PRESSURE: 72 MMHG | HEART RATE: 71 BPM | SYSTOLIC BLOOD PRESSURE: 166 MMHG

## 2023-11-14 DIAGNOSIS — N39.41 URGE INCONTINENCE: ICD-10-CM

## 2023-11-14 DIAGNOSIS — L90.0 LICHEN SCLEROSUS ET ATROPHICUS: ICD-10-CM

## 2023-11-14 DIAGNOSIS — R39.15 URINARY URGENCY: ICD-10-CM

## 2023-11-14 DIAGNOSIS — N95.2 VAGINAL ATROPHY: ICD-10-CM

## 2023-11-14 DIAGNOSIS — R35.0 URINARY FREQUENCY: Primary | ICD-10-CM

## 2023-11-14 PROCEDURE — 1090F PRES/ABSN URINE INCON ASSESS: CPT | Performed by: NURSE PRACTITIONER

## 2023-11-14 PROCEDURE — 0509F URINE INCON PLAN DOCD: CPT | Performed by: NURSE PRACTITIONER

## 2023-11-14 PROCEDURE — 99213 OFFICE O/P EST LOW 20 MIN: CPT | Performed by: NURSE PRACTITIONER

## 2023-11-14 PROCEDURE — G8484 FLU IMMUNIZE NO ADMIN: HCPCS | Performed by: NURSE PRACTITIONER

## 2023-11-14 PROCEDURE — G8421 BMI NOT CALCULATED: HCPCS | Performed by: NURSE PRACTITIONER

## 2023-11-14 PROCEDURE — G8427 DOCREV CUR MEDS BY ELIG CLIN: HCPCS | Performed by: NURSE PRACTITIONER

## 2023-11-14 PROCEDURE — 1036F TOBACCO NON-USER: CPT | Performed by: NURSE PRACTITIONER

## 2023-11-14 PROCEDURE — 1123F ACP DISCUSS/DSCN MKR DOCD: CPT | Performed by: NURSE PRACTITIONER

## 2023-11-14 NOTE — PROGRESS NOTES
present. Mental Status: She is alert. Psychiatric:         Mood and Affect: Mood normal.         Behavior: Behavior normal.         No results found for this visit on 11/14/23. Assessment/Plan:     Sae Fernando is a 80 y.o. female with   1. Urinary frequency    2. Urinary urgency    3. Urge incontinence    4. Lichen sclerosus et atrophicus    5. Vaginal atrophy    UUI: Referral placed so she may have PFPT with Saratha Novel again. She is having more incontinence episodes than previously    Lichen:  continues Clobetasol and estrogen as directed  We did discuss obtaining further refills of Estrogen from [place] as the cost has gone up with her insurance. She is not in need of refills now but will call when needed  IKER Escobedo CNP      Orders Placed This Encounter   Procedures    Ambulatory referral to Physical Therapy     Referral Priority:   Routine     Referral Type:   Physical Therapy     Referral Reason:   Specialty Services Required     Referred to Provider:   Rosetta Pinon PT     Number of Visits Requested:   1     No orders of the defined types were placed in this encounter.       IKER Escobedo CNP

## 2024-05-09 ENCOUNTER — OFFICE VISIT (OUTPATIENT)
Dept: UROGYNECOLOGY | Age: 89
End: 2024-05-09
Payer: MEDICARE

## 2024-05-09 VITALS
HEART RATE: 81 BPM | RESPIRATION RATE: 16 BRPM | SYSTOLIC BLOOD PRESSURE: 137 MMHG | TEMPERATURE: 97.7 F | OXYGEN SATURATION: 97 % | DIASTOLIC BLOOD PRESSURE: 80 MMHG

## 2024-05-09 DIAGNOSIS — L90.0 LICHEN SCLEROSUS ET ATROPHICUS: ICD-10-CM

## 2024-05-09 DIAGNOSIS — N39.41 URGE INCONTINENCE: ICD-10-CM

## 2024-05-09 DIAGNOSIS — N95.2 VAGINAL ATROPHY: ICD-10-CM

## 2024-05-09 DIAGNOSIS — R39.15 URINARY URGENCY: Primary | ICD-10-CM

## 2024-05-09 PROCEDURE — 99213 OFFICE O/P EST LOW 20 MIN: CPT | Performed by: NURSE PRACTITIONER

## 2024-05-09 PROCEDURE — 1036F TOBACCO NON-USER: CPT | Performed by: NURSE PRACTITIONER

## 2024-05-09 PROCEDURE — 1090F PRES/ABSN URINE INCON ASSESS: CPT | Performed by: NURSE PRACTITIONER

## 2024-05-09 PROCEDURE — 1123F ACP DISCUSS/DSCN MKR DOCD: CPT | Performed by: NURSE PRACTITIONER

## 2024-05-09 PROCEDURE — G8421 BMI NOT CALCULATED: HCPCS | Performed by: NURSE PRACTITIONER

## 2024-05-09 PROCEDURE — G8427 DOCREV CUR MEDS BY ELIG CLIN: HCPCS | Performed by: NURSE PRACTITIONER

## 2024-05-09 PROCEDURE — 0509F URINE INCON PLAN DOCD: CPT | Performed by: NURSE PRACTITIONER

## 2024-05-09 RX ORDER — DORZOLAMIDE HYDROCHLORIDE AND TIMOLOL MALEATE 20; 5 MG/ML; MG/ML
SOLUTION/ DROPS OPHTHALMIC
COMMUNITY
Start: 2024-04-15

## 2024-05-09 NOTE — PROGRESS NOTES
Medications   Medication Sig Dispense Refill    dorzolamide-timolol (COSOPT) 2-0.5 % ophthalmic solution       lisinopril (PRINIVIL;ZESTRIL) 5 MG tablet Take 1 tablet by mouth daily      estradiol (ESTRACE VAGINAL) 0.1 MG/GM vaginal cream Place 0.25 g vaginally daily Apply 0.25g with fingertip to the vaginal opening every night at bedtime for 2 weeks, then decrease to twice weekly. 30 g 0    clobetasol (TEMOVATE) 0.05 % cream APPLY VAGINALLY TWICE DAILY FOR 5 WEEKS, THEN EVERY OTHER DAY FOR 2 WEEKS, THEN JUST 1 TO 2 TIMES WEEKLY FOR MAINTENANCE      clobetasol prop emollient base 0.05 % CREA Apply vaginally twice daily for 5 weeks, then every other day for 2 weeks, then just 1 to 2 times weekly for maintenance. 45 g 2    Biotin 1000 MCG TABS Take 1 tablet by mouth daily      Cholecalciferol (VITAMIN D3) 125 MCG (5000 UT) TABS Take 1 tablet by mouth daily      Krill Oil (OMEGA-3) 500 MG CAPS Take 1 tablet by mouth daily 4 IN 1      Multiple Vitamins-Minerals (CENTRUM SILVER PO) Take 1 tablet by mouth daily      psyllium (METAMUCIL) 58.6 % packet Take 1 packet by mouth as needed      aspirin 81 MG chewable tablet Take 1 tablet by mouth nightly      Ascorbic Acid (VITAMIN C) 1000 MG tablet Take 1 tablet by mouth daily      pyridoxine (B-6) 100 MG tablet Take 1 tablet by mouth daily      cyanocobalamin 1000 MCG tablet Take 1 tablet by mouth daily      pilocarpine (PILOCAR) 2 % ophthalmic solution Place 1 drop into the right eye 2 times daily      Multiple Vitamins-Minerals (VITEYES AREDS FORMULA) CAPS Take 1 capsule by mouth daily       mirabegron (MYRBETRIQ) 25 MG TB24 Take 1 tablet by mouth daily (Patient not taking: Reported on 1/11/2023) 30 tablet 1     No current facility-administered medications for this visit.     Social History:   Social History     Socioeconomic History    Marital status:      Spouse name: Not on file    Number of children: Not on file    Years of education: Not on file    Highest

## 2024-06-26 ENCOUNTER — HOSPITAL ENCOUNTER (OUTPATIENT)
Dept: PHYSICAL THERAPY | Age: 89
Setting detail: THERAPIES SERIES
Discharge: HOME | End: 2024-06-26
Payer: MEDICARE

## 2024-06-26 DIAGNOSIS — N39.46 MIXED STRESS AND URGE URINARY INCONTINENCE: ICD-10-CM

## 2024-06-26 DIAGNOSIS — R39.15 URINARY URGENCY: Primary | ICD-10-CM

## 2024-06-26 DIAGNOSIS — M62.89 PELVIC FLOOR DYSFUNCTION: ICD-10-CM

## 2024-06-26 DIAGNOSIS — R35.0 URINARY FREQUENCY: ICD-10-CM

## 2024-06-26 DIAGNOSIS — K59.04 CHRONIC IDIOPATHIC CONSTIPATION: ICD-10-CM

## 2024-06-26 PROCEDURE — 97110 THERAPEUTIC EXERCISES: CPT | Performed by: PHYSICAL THERAPIST

## 2024-06-26 PROCEDURE — 97112 NEUROMUSCULAR REEDUCATION: CPT | Performed by: PHYSICAL THERAPIST

## 2024-06-26 PROCEDURE — 97162 PT EVAL MOD COMPLEX 30 MIN: CPT | Performed by: PHYSICAL THERAPIST

## 2024-06-26 PROCEDURE — 97140 MANUAL THERAPY 1/> REGIONS: CPT | Performed by: PHYSICAL THERAPIST

## 2024-06-26 PROCEDURE — 97530 THERAPEUTIC ACTIVITIES: CPT | Performed by: PHYSICAL THERAPIST

## 2024-06-26 NOTE — PROGRESS NOTES
recreational activity without symptoms or restrictions.    After reviewing bowel/bladder behavioral modification information, PT used a pelvic floor model to orient the patient with her pelvic organ and pelvic floor muscles anatomy. Patient verbally consented to transvaginal pelvic muscle floor muscle assessment which revealed poor control/power of her pelvic floor muscles with inconsistent mind-body connection and limited coordination. Patient noted to have generalized tightness with small introitus due to lichen sclerosis with clitoral adhesions. Encouraged patient to avoid PF muscle holding contractions due to tightness noted in PF muscles.   Noted  moderate core instability and incoordination which can significantly effect systematic control of intraabdominal pressures subsequently challenge PF muscle control during functional activities.  With transvaginal palpation to PF muscles, noted moderate to severe tightness and tenderness in PF muscles.  Also noted adhesions with limited scar mobility in abdomen around umbilicus from from previous surgeries likely contributing to patient's symptoms.   Instructed patient in diaphragmatic breathing for PF relaxation and overall down training and gentle stretches for pelvic floor muscles.        Patient would definitely benefit from pelvic floor physical therapy for manual interventions, continued education on healthy bladder/bowel habits and adjustment of behavioral modifications as well as advanced activities to improve muscle control/ coordination and endurance of pelvic floor, core, and hip muscles to decrease pelvic/abdominal pain,  urinary frequency, urgency and incontinence, and constipation.       Medical Necessity Documentation:  I certify that this patient meets the below criteria necessary for medical necessity for care and/or justification of therapy services:  The patient has functional impairments and/or activity limitations and would benefit from continued

## 2024-07-18 NOTE — FLOWSHEET NOTE
(95485) 30 2  [facility] T[facility] (78372)     Gait (37097)    Dry Needle 1-2 muscle (18729)     Aquatic Therex (40593)    Dry Needle 3+ muscle (20561)     Iontophoresis (14372)    VASO (17304)     Ultrasound (12225)    Group Therapy (02656)     Estim Attended (44437)    Canalith Repositioning (53510)     Other:    Other:    Total Timed Code Tx Minutes 60 4       Total Treatment Minutes 60        Charge Justification:  (80966) THERAPEUTIC EXERCISE - Provided verbal/tactile cueing for activities related to strengthening, flexibility, endurance, ROM performed to prevent loss of range of motion, maintain or improve muscular strength or increase flexibility, following either an injury or surgery.   (70195) NEUROMUSCULAR RE-EDUCATION - Therapeutic procedure, 1 or more areas, each 15 minutes; neuromuscular reeducation of movement, balance, coordination, kinesthetic sense, posture, and/or proprioception for sitting and/or standing activities  (66359) THERAPEUTIC ACTIVITY - use of dynamic activities to improve functional performance. (Ex include squatting, ascending/descending stairs, walking, bending, lifting, catching, throwing, pushing, pulling, jumping.)  Direct, one on one contact, billed in 15-minute increments.  (67182) MANUAL THERAPY -  Manual therapy techniques, 1 or more regions, each 15 minutes (Mobilization/manipulation, manual lymphatic drainage, manual traction) for the purpose of modulating pain, promoting relaxation,  increasing ROM, reducing/eliminating soft tissue swelling/inflammation/restriction, improving soft tissue extensibility and allowing for proper ROM for normal function with self care, mobility, lifting and ambulation      GOALS     Patient stated goal:  not have to always worry about leaking urine while she is out, decreased amount and frequency of urination so she does not need to wear so many pads (up to 3/day) in underwear and adult diaper over the top so she can continue to play bridge with her

## 2024-07-24 ENCOUNTER — HOSPITAL ENCOUNTER (OUTPATIENT)
Dept: PHYSICAL THERAPY | Age: 89
Setting detail: THERAPIES SERIES
Discharge: HOME | End: 2024-07-24
Payer: MEDICARE

## 2024-07-24 DIAGNOSIS — R39.15 URINARY URGENCY: ICD-10-CM

## 2024-07-24 DIAGNOSIS — N39.41 URGE INCONTINENCE OF URINE: Primary | ICD-10-CM

## 2024-07-24 PROCEDURE — 97110 THERAPEUTIC EXERCISES: CPT | Performed by: PHYSICAL THERAPIST

## 2024-07-24 PROCEDURE — 97112 NEUROMUSCULAR REEDUCATION: CPT | Performed by: PHYSICAL THERAPIST

## 2024-07-24 PROCEDURE — 97530 THERAPEUTIC ACTIVITIES: CPT | Performed by: PHYSICAL THERAPIST

## 2024-08-08 NOTE — FLOWSHEET NOTE
interventions:    Interventions:  Therapeutic Exercise (84243) including: strength training, ROM, and functional mobility  Therapeutic Activities (26175) including: functional mobility training and education.  Neuromuscular Re-education (04156) activation and proprioception, including postural re-education.    Manual Therapy (18879) as indicated to include: Soft Tissue Mobilization, Trigger Point Release, and Myofascial Release  Patient education on postural re-education, activity modification, progression of HEP, and pelvic floor/bowel and bladder anatomy and function    Plan: POC initiated as per evaluation    Add hip flexor stretches as able and tolerated.      Review and offer modifications as requested for current stretches and integrated home program.     Continued education on multifactorial contributions to increased symptoms/flares.     Reassess and address PF muscle tissues externally and transvaginally as patient consents.      Eventually, add in core stabilization activities while monitoring for response in PF muscles, ensuring tightness and tension does not return to previous levels.       Electronically Signed by JIA ASHBY, PT  Date: 08/13/2024     Note: Portions of this note have been templated and/or copied from initial evaluation, reassessments and prior notes for documentation efficiency.    Note: If patient does not return for scheduled/recommended follow up visits, this note will serve as a discharge from care along with the most recent update on progress.

## 2024-08-13 ENCOUNTER — HOSPITAL ENCOUNTER (OUTPATIENT)
Dept: PHYSICAL THERAPY | Age: 89
Setting detail: THERAPIES SERIES
Discharge: HOME | End: 2024-08-13
Payer: MEDICARE

## 2024-08-13 DIAGNOSIS — R35.0 URINARY FREQUENCY: ICD-10-CM

## 2024-08-13 DIAGNOSIS — N39.46 MIXED STRESS AND URGE URINARY INCONTINENCE: ICD-10-CM

## 2024-08-13 DIAGNOSIS — N39.41 URGE INCONTINENCE OF URINE: Primary | ICD-10-CM

## 2024-08-13 DIAGNOSIS — M62.89 PELVIC FLOOR DYSFUNCTION: ICD-10-CM

## 2024-08-13 DIAGNOSIS — K59.04 CHRONIC IDIOPATHIC CONSTIPATION: ICD-10-CM

## 2024-08-13 DIAGNOSIS — R39.15 URINARY URGENCY: ICD-10-CM

## 2024-08-13 PROCEDURE — 97530 THERAPEUTIC ACTIVITIES: CPT | Performed by: PHYSICAL THERAPIST

## 2024-08-13 PROCEDURE — 97110 THERAPEUTIC EXERCISES: CPT | Performed by: PHYSICAL THERAPIST

## 2024-08-13 PROCEDURE — 97112 NEUROMUSCULAR REEDUCATION: CPT | Performed by: PHYSICAL THERAPIST

## 2024-08-15 NOTE — FLOWSHEET NOTE
[facility]- Outpatient Rehabilitation and Therapy [address], [address], [place] [address] office: [phone] fax: [phone]     Physical Therapy Treatment Note      Physical Therapy: TREATMENT/PROGRESS NOTE   Patient: Batsheva Peter (89 y.o. female)   Examination Date: 2024   :  3/18/1932 MRN: [id number]   Visit #: 4   Insurance Allowable Auth Needed   NA []Yes    [x]No    Insurance: Payor: MEDICARE / Plan: MEDICARE PART A AND B / Product Type: *No Product type* /   Insurance ID: [id number] - (Medicare)  Secondary Insurance (if applicable): [facility]   Treatment Diagnosis:     ICD-10-CM    1. Urge incontinence of urine  N39.41       2. Urinary urgency  R39.15       3. Urinary frequency  R35.0       4. Mixed stress and urge urinary incontinence  N39.46       5. Chronic idiopathic constipation  K59.04       6. Pelvic floor dysfunction  M62.89              Medical Diagnosis:  Urinary urgency [R39.15]  Urge incontinence [N39.41]   Referring Physician: Maureen Kumari APRN*  PCP: Geeta Saenz MD   Plan of care signed (Y/N): Y  Cosigned by: Maureen Kumari APRN - CNP at 2024  9:25 AM       Date of Patient follow up with Physician: 2024     Progress Report/POC: NO  POC update due: 10 visits /OR AUTH LIMITS, whichever is less                                            Precautions/ Contra-indications:           Latex allergy:  NO  Pacemaker:    NO  Contraindications for Manipulation: None  Date of Surgery: NA  Other:    Red Flags:  None    C-SSRS Triggered by Intake questionnaire:   Patient answered 'NO' to both behavioral questions on intake.  No further screening warranted    Preferred Language for Healthcare:   [x] English       [] other:    SUBJECTIVE EXAMINATION     2024- Patient stated complaint/comments: bladder issues - urinary frequency, urgency, and incontinence with coughing/sneezing as well as with bending/lifting - and chronic  for proper ROM for normal function with self care, mobility, lifting and ambulation      GOALS     Patient stated goal:  not have to always worry about leaking urine while she is out, decreased amount and frequency of urination so she does not need to wear so many pads (up to 3/day) in underwear and adult diaper over the top so she can continue to play bridge with her friends, holding urine, and not leak    [x] Progressing: [] Met: [] Not Met: [] Adjusted      Therapist goals for Patient:   Short Term Goals: To be achieved in: 4-5 sessions     1. Patient will have a decrease in pelvic pain/pressure due to constipation and/or urinary urgency, frequency and incontinence  to indicate improvement in pelvic floor strength/motor control and relaxation, muscle coordination, improved intraabdominal/intrapelvic pressures, and/or bladder retraining.  [x] Progressing: [] Met: [] Not Met: [] Adjusted  2. Perform HEP for pelvic floor and core muscle groups with minimal direction from therapist as she progresses to become more independent managing her intraabdominal/intrapelvic pressure and PF and surrounding core muscle weakness and/or tightness.  [x] Progressing: [] Met: [] Not Met: [] Adjusted  3. Report using 1-2 behavioral modification strategies to reduce urinary/bowel complaints through dietary and mechanical changes, with focus on full emptying of bladder with each urination and using optimal positioning and deep breathing for relaxation of posterior PF muscles during defacation, reducing need to strain.  [x] Progressing: [] Met: [] Not Met: [] Adjusted     Long Term Goals: To be achieved in: 8-10 sessions     1. Improve score of quality of life index measure, PFDI-20, to 75 or less (initial eval - 109.38) disability index to assist with reaching prior level of function and demonstrating improved quality of life with less life interruptions due to pelvic pressure/pain with PF and surrounding core muscle weakness and/or

## 2024-08-28 ENCOUNTER — HOSPITAL ENCOUNTER (OUTPATIENT)
Dept: PHYSICAL THERAPY | Age: 89
Setting detail: THERAPIES SERIES
Discharge: HOME | End: 2024-08-28
Payer: MEDICARE

## 2024-08-28 DIAGNOSIS — N39.46 MIXED STRESS AND URGE URINARY INCONTINENCE: ICD-10-CM

## 2024-08-28 DIAGNOSIS — K59.04 CHRONIC IDIOPATHIC CONSTIPATION: ICD-10-CM

## 2024-08-28 DIAGNOSIS — N39.41 URGE INCONTINENCE OF URINE: Primary | ICD-10-CM

## 2024-08-28 DIAGNOSIS — R35.0 URINARY FREQUENCY: ICD-10-CM

## 2024-08-28 DIAGNOSIS — M62.89 PELVIC FLOOR DYSFUNCTION: ICD-10-CM

## 2024-08-28 DIAGNOSIS — R39.15 URINARY URGENCY: ICD-10-CM

## 2024-08-28 PROCEDURE — 97112 NEUROMUSCULAR REEDUCATION: CPT | Performed by: PHYSICAL THERAPIST

## 2024-08-28 PROCEDURE — 97110 THERAPEUTIC EXERCISES: CPT | Performed by: PHYSICAL THERAPIST

## 2024-08-28 PROCEDURE — 97530 THERAPEUTIC ACTIVITIES: CPT | Performed by: PHYSICAL THERAPIST

## 2024-09-18 ENCOUNTER — HOSPITAL ENCOUNTER (OUTPATIENT)
Dept: PHYSICAL THERAPY | Age: 89
Setting detail: THERAPIES SERIES
Discharge: HOME | End: 2024-09-18
Payer: MEDICARE

## 2024-09-18 DIAGNOSIS — R35.0 URINARY FREQUENCY: ICD-10-CM

## 2024-09-18 DIAGNOSIS — N39.41 URGE INCONTINENCE OF URINE: Primary | ICD-10-CM

## 2024-09-18 DIAGNOSIS — M62.89 PELVIC FLOOR DYSFUNCTION: ICD-10-CM

## 2024-09-18 DIAGNOSIS — R39.15 URINARY URGENCY: ICD-10-CM

## 2024-09-18 DIAGNOSIS — K59.04 CHRONIC IDIOPATHIC CONSTIPATION: ICD-10-CM

## 2024-09-18 DIAGNOSIS — N39.46 MIXED STRESS AND URGE URINARY INCONTINENCE: ICD-10-CM

## 2024-09-18 PROCEDURE — 97032 APPL MODALITY 1+ESTIM EA 15: CPT | Performed by: PHYSICAL THERAPIST

## 2024-09-18 PROCEDURE — 97112 NEUROMUSCULAR REEDUCATION: CPT | Performed by: PHYSICAL THERAPIST

## 2024-09-18 PROCEDURE — 97530 THERAPEUTIC ACTIVITIES: CPT | Performed by: PHYSICAL THERAPIST

## 2024-09-18 PROCEDURE — 97110 THERAPEUTIC EXERCISES: CPT | Performed by: PHYSICAL THERAPIST

## 2024-10-01 NOTE — PLAN OF CARE
independently as she progresses to self-manage her pelvic pressure/pain and PF and surrounding core muscle weakness and/or tightness.  [x] Progressing: [] Met: [] Not Met: [] Adjusted         Overall Progression Towards Functional goals/ Treatment Progress Update:  [x] Patient is progressing as expected towards functional goals listed.    [x] Progression is slowed due to complexities/Impairments listed.  [] Progression has been slowed due to co-morbidities.  [] Plan just implemented, too soon (<30days) to assess goals progression   [] Goals require adjustment due to lack of progress  [] Patient is not progressing as expected and requires additional follow up with physician  [] Other:     TREATMENT PLAN     Frequency/Duration:  once every 2-4 weeks for 4-6 months   for the following treatment interventions:    Interventions:  Therapeutic Exercise (29683) including: strength training, ROM, and functional mobility  Therapeutic Activities (16241) including: functional mobility training and education.  Neuromuscular Re-education (98510) activation and proprioception, including postural re-education.    Manual Therapy (61818) as indicated to include: Soft Tissue Mobilization, Trigger Point Release, and Myofascial Release  Patient education on postural re-education, activity modification, progression of HEP, and pelvic floor/bowel and bladder anatomy and function    Plan: Cont POC- Continue emphasis/focus on exercise progression, improving proper muscle recruitment and activation/motor control patterns, modulating pain, promoting relaxation, increasing ROM, improving soft tissue extensibility, and improving postural awareness. Next visit plan to add new exercises, adjust HEP, continue current phase, and       Ongoing education/follow up to ensure effective use of TENS/TTNS for overactive bladder.     Add hip flexor stretches as able and tolerated.      Review and offer modifications as requested for current stretches and

## 2024-10-09 ENCOUNTER — HOSPITAL ENCOUNTER (OUTPATIENT)
Dept: PHYSICAL THERAPY | Age: 89
Setting detail: THERAPIES SERIES
Discharge: HOME | End: 2024-10-09
Payer: MEDICARE

## 2024-10-09 DIAGNOSIS — N39.46 MIXED STRESS AND URGE URINARY INCONTINENCE: ICD-10-CM

## 2024-10-09 DIAGNOSIS — M62.89 PELVIC FLOOR DYSFUNCTION: ICD-10-CM

## 2024-10-09 DIAGNOSIS — R35.0 URINARY FREQUENCY: ICD-10-CM

## 2024-10-09 DIAGNOSIS — N39.41 URGE INCONTINENCE OF URINE: Primary | ICD-10-CM

## 2024-10-09 DIAGNOSIS — R39.15 URINARY URGENCY: ICD-10-CM

## 2024-10-09 DIAGNOSIS — K59.04 CHRONIC IDIOPATHIC CONSTIPATION: ICD-10-CM

## 2024-10-09 PROCEDURE — 97110 THERAPEUTIC EXERCISES: CPT | Performed by: PHYSICAL THERAPIST

## 2024-10-09 PROCEDURE — 97112 NEUROMUSCULAR REEDUCATION: CPT | Performed by: PHYSICAL THERAPIST

## 2024-10-09 PROCEDURE — 97530 THERAPEUTIC ACTIVITIES: CPT | Performed by: PHYSICAL THERAPIST

## 2024-10-31 NOTE — FLOWSHEET NOTE
[facility]- Outpatient Rehabilitation and Therapy [address], [address], [place] [address] office: [phone] fax: [phone]     Physical Therapy Treatment Note           Physical Therapy: TREATMENT/PROGRESS NOTE   Patient: Batsheva Peter (89 y.o. female)   Examination Date: 2024   :  3/18/1932 MRN: [id number]   Visit #: 7   Insurance Allowable Auth Needed   BMN []Yes    [x]No    Insurance: Payor: MEDICARE / Plan: MEDICARE PART A AND B / Product Type: *No Product type* /   Insurance ID: [id number] - (Medicare)  Secondary Insurance (if applicable): [facility]   Treatment Diagnosis:     ICD-10-CM    1. Urge incontinence of urine  N39.41       2. Urinary urgency  R39.15       3. Urinary frequency  R35.0       4. Mixed stress and urge urinary incontinence  N39.46       5. Chronic idiopathic constipation  K59.04       6. Pelvic floor dysfunction  M62.89                    Medical Diagnosis:  Urinary urgency [R39.15]  Urge incontinence [N39.41]   Referring Physician: Maureen Kumari APRN*  PCP: Geeta Saenz MD   Plan of care signed (Y/N): Y  Cosigned by: Maureen Kumari APRN - CNP at 2024  9:25 AM     Re-certification from 10/9/2024  Cosigned by: Maureen Kumari APRN - CNP at 10/16/2024  2:44 PM       Date of Patient follow up with Physician: 2024     Progress Report/POC: NO  POC update due: 10 visits /OR AUTH LIMITS, whichever is less                                            Precautions/ Contra-indications:           Latex allergy:  NO  Pacemaker:    NO  Contraindications for Manipulation: None  Date of Surgery: NA  Other:    Red Flags:  None    C-SSRS Triggered by Intake questionnaire:   Patient answered 'NO' to both behavioral questions on intake.  No further screening warranted    Preferred Language for Healthcare:   [x] English       [] other:    SUBJECTIVE EXAMINATION     2024- Patient stated complaint/comments: bladder issues - urinary

## 2024-11-12 ENCOUNTER — OFFICE VISIT (OUTPATIENT)
Dept: UROGYNECOLOGY | Age: 89
End: 2024-11-12
Payer: MEDICARE

## 2024-11-12 VITALS
TEMPERATURE: 97.4 F | SYSTOLIC BLOOD PRESSURE: 183 MMHG | OXYGEN SATURATION: 98 % | HEART RATE: 69 BPM | DIASTOLIC BLOOD PRESSURE: 89 MMHG | RESPIRATION RATE: 18 BRPM

## 2024-11-12 DIAGNOSIS — L90.0 LICHEN SCLEROSUS ET ATROPHICUS: ICD-10-CM

## 2024-11-12 DIAGNOSIS — R35.0 URINARY FREQUENCY: ICD-10-CM

## 2024-11-12 DIAGNOSIS — N39.41 URGE INCONTINENCE: ICD-10-CM

## 2024-11-12 DIAGNOSIS — R39.15 URINARY URGENCY: Primary | ICD-10-CM

## 2024-11-12 PROCEDURE — 1123F ACP DISCUSS/DSCN MKR DOCD: CPT | Performed by: NURSE PRACTITIONER

## 2024-11-12 PROCEDURE — 1159F MED LIST DOCD IN RCRD: CPT | Performed by: NURSE PRACTITIONER

## 2024-11-12 PROCEDURE — G8484 FLU IMMUNIZE NO ADMIN: HCPCS | Performed by: NURSE PRACTITIONER

## 2024-11-12 PROCEDURE — G8427 DOCREV CUR MEDS BY ELIG CLIN: HCPCS | Performed by: NURSE PRACTITIONER

## 2024-11-12 PROCEDURE — 0509F URINE INCON PLAN DOCD: CPT | Performed by: NURSE PRACTITIONER

## 2024-11-12 PROCEDURE — 1036F TOBACCO NON-USER: CPT | Performed by: NURSE PRACTITIONER

## 2024-11-12 PROCEDURE — 1090F PRES/ABSN URINE INCON ASSESS: CPT | Performed by: NURSE PRACTITIONER

## 2024-11-12 PROCEDURE — 99213 OFFICE O/P EST LOW 20 MIN: CPT | Performed by: NURSE PRACTITIONER

## 2024-11-12 PROCEDURE — G8421 BMI NOT CALCULATED: HCPCS | Performed by: NURSE PRACTITIONER

## 2024-11-12 RX ORDER — CLOBETASOL PROPIONATE 0.5 MG/G
OINTMENT TOPICAL
Qty: 30 G | Refills: 2 | Status: SHIPPED | OUTPATIENT
Start: 2024-11-12

## 2024-11-12 NOTE — PROGRESS NOTES
2024       HPI:     Name: Batsheva Peter  YOB: 1932    CC: Patient is a 89 y.o. presenting for evaluation of urinary urgency, UUI, vaginal atrophy, and lichens.       HPI:     How long have you had this problem?  years  Please rate the severity of your problem:   Anything make it better?   Patient continues using vaginal estrogen cream and clobetasol cream every other day on opposing days.   Has been attending PFPT - states this is somewhat helpful, feels her urinary urgency has improved.       Ob/Gyn History:    OB History    Para Term  AB Living   6 4 4   2 4   SAB IAB Ectopic Molar Multiple Live Births   2         4      # Outcome Date GA Lbr Nick/2nd Weight Sex Type Anes PTL Lv   6 Term      Vag-Spont   DALE   5 Term      Vag-Spont   DALE   4 Term      Vag-Spont   DALE   3 SAB         FD   2 SAB         FD   1 Term      Vag-Spont   DALE     Past Medical History:   Past Medical History:   Diagnosis Date    Constipation     OCCAS    Frequent urination 02/15/2022    Frequent urination 2022    Glaucoma     Loss of bladder control 2022    Thyroid disease     NO MEDS-BORDERLINE    Urinary leakage      Past Surgical History:   Past Surgical History:   Procedure Laterality Date    ANTERIOR CRUCIATE LIGAMENT REPAIR Right     APPENDECTOMY      WITH COLON SURGERY    CHOLECYSTECTOMY      COLON SURGERY      COLON RUPTURE WITH COLOSTOMY    COLONOSCOPY      SEVERAL    EYE SURGERY      cataract    OTHER SURGICAL HISTORY      COLOSTOMY REVERSAL    OTHER SURGICAL HISTORY      HEMTOMA-ABOVE FOREHEAD AFTER FALL SURGERY-I&D    VULVAR/PERINEAL BIOPSY N/A 2022    VULVAR BIOPSY AND SEPARATION OF VULVA, CYSTOSCOPY performed by Julian Courtney MD at [facility] OR     Allergies:   Allergies   Allergen Reactions    Codeine Nausea Only and Nausea And Vomiting     \"feels like I'm going to pass out\"    Morphine And Codeine Nausea And Vomiting and Other (See Comments)     \"passing out\"

## 2024-11-13 ENCOUNTER — HOSPITAL ENCOUNTER (OUTPATIENT)
Dept: PHYSICAL THERAPY | Age: 89
Setting detail: THERAPIES SERIES
Discharge: HOME | End: 2024-11-13
Payer: MEDICARE

## 2024-11-13 DIAGNOSIS — M62.89 PELVIC FLOOR DYSFUNCTION: ICD-10-CM

## 2024-11-13 DIAGNOSIS — R39.15 URINARY URGENCY: ICD-10-CM

## 2024-11-13 DIAGNOSIS — R35.0 URINARY FREQUENCY: ICD-10-CM

## 2024-11-13 DIAGNOSIS — K59.04 CHRONIC IDIOPATHIC CONSTIPATION: ICD-10-CM

## 2024-11-13 DIAGNOSIS — N39.41 URGE INCONTINENCE OF URINE: Primary | ICD-10-CM

## 2024-11-13 DIAGNOSIS — N39.46 MIXED STRESS AND URGE URINARY INCONTINENCE: ICD-10-CM

## 2024-11-13 PROCEDURE — 97110 THERAPEUTIC EXERCISES: CPT | Performed by: PHYSICAL THERAPIST

## 2024-11-13 PROCEDURE — 97530 THERAPEUTIC ACTIVITIES: CPT | Performed by: PHYSICAL THERAPIST

## 2024-11-13 PROCEDURE — 97112 NEUROMUSCULAR REEDUCATION: CPT | Performed by: PHYSICAL THERAPIST

## 2024-11-13 RX ORDER — CLOBETASOL PROPIONATE 0.5 MG/G
CREAM TOPICAL
Qty: 45 G | Refills: 11 | OUTPATIENT
Start: 2024-11-13

## 2025-05-08 ENCOUNTER — OFFICE VISIT (OUTPATIENT)
Dept: UROGYNECOLOGY | Age: 89
End: 2025-05-08

## 2025-05-08 VITALS
RESPIRATION RATE: 16 BRPM | HEART RATE: 60 BPM | SYSTOLIC BLOOD PRESSURE: 165 MMHG | OXYGEN SATURATION: 100 % | DIASTOLIC BLOOD PRESSURE: 79 MMHG | TEMPERATURE: 97.4 F

## 2025-05-08 DIAGNOSIS — N95.2 VAGINAL ATROPHY: ICD-10-CM

## 2025-05-08 DIAGNOSIS — L90.0 LICHEN SCLEROSUS ET ATROPHICUS: Primary | ICD-10-CM

## 2025-05-08 NOTE — PROGRESS NOTES
2025       HPI:     Name: Batsheva Peter  YOB: 1932    CC: Patient is a 89 y.o. presenting for evaluation of urinary urgency and Lichens Sclerosus.       HPI:   Patient uses Clobetasol and vaginal estrogen cream. Previously attended PFPT for UUI, which she found helpful.    How long have you had this problem?  years  Please rate the severity of your problem:   Anything make it better?   Patient continues using vaginal estrogen cream and clobetasol cream every other day on opposing days       Ob/Gyn History:    OB History    Para Term  AB Living   6 4 4  2 4   SAB IAB Ectopic Molar Multiple Live Births   2     4      # Outcome Date GA Lbr Nick/2nd Weight Sex Type Anes PTL Lv   6 Term      Vag-Spont   DALE   5 Term      Vag-Spont   DALE   4 Term      Vag-Spont   DALE   3 SAB         FD   2 SAB         FD   1 Term      Vag-Spont   DALE     Past Medical History:   Past Medical History:   Diagnosis Date    Constipation     OCCAS    Frequent urination 02/15/2022    Frequent urination 2022    Glaucoma     Loss of bladder control 2022    Thyroid disease     NO MEDS-BORDERLINE    Urinary leakage      Past Surgical History:   Past Surgical History:   Procedure Laterality Date    ANTERIOR CRUCIATE LIGAMENT REPAIR Right     APPENDECTOMY      WITH COLON SURGERY    CHOLECYSTECTOMY      COLON SURGERY      COLON RUPTURE WITH COLOSTOMY    COLONOSCOPY      SEVERAL    EYE SURGERY      cataract    OTHER SURGICAL HISTORY      COLOSTOMY REVERSAL    OTHER SURGICAL HISTORY      HEMTOMA-ABOVE FOREHEAD AFTER FALL SURGERY-I&D    VULVAR/PERINEAL BIOPSY N/A 2022    VULVAR BIOPSY AND SEPARATION OF VULVA, CYSTOSCOPY performed by Julian Courtney MD at [facility] OR     Allergies:   Allergies   Allergen Reactions    Codeine Nausea Only and Nausea And Vomiting     \"feels like I'm going to pass out\"    Morphine And Codeine Nausea And Vomiting and Other (See Comments)     \"passing out\"       Current

## (undated) DEVICE — YANKAUER,BULB TIP,W/O VENT,RIGID,STERILE: Brand: MEDLINE

## (undated) DEVICE — SOLUTION IV IRRIG POUR BRL 0.9% SODIUM CHL 2F7124

## (undated) DEVICE — GLOVE,SURG,SENSICARE SLT,LF,PF,6: Brand: MEDLINE

## (undated) DEVICE — DRAPE LAP 104X35X124IN BLU CTRL + FAB REINF ABD FEN

## (undated) DEVICE — CYSTO/BLADDER IRRIGATION SET, REGULATING CLAMP

## (undated) DEVICE — GLOVE SURG SZ 65 THK91MIL LTX FREE SYN POLYISOPRENE

## (undated) DEVICE — CATHETER,URETHRAL,REDRUBBER,COUDE,14FR: Brand: MEDLINE

## (undated) DEVICE — SUTURE VCRL + SZ 2-0 L27IN ABSRB WHT SH 1/2 CIR TAPERCUT VCP417H

## (undated) DEVICE — MINOR LITHOTOMY: Brand: MEDLINE INDUSTRIES, INC.

## (undated) DEVICE — SUTURE PERMA-HAND SZ 2-0 L30IN NONABSORBABLE BLK L26MM SH K833H

## (undated) DEVICE — SUTURE VCRL + SZ 4-0 L27IN ABSRB UD L26MM SH 1/2 CIR VCP415H

## (undated) DEVICE — PAD SANITARY MTRN TAB BELT WRP NS 11IN

## (undated) DEVICE — STRAP RESTRN W3.5XL18IN STD ALL PURP DISP W/ SLNG RNG

## (undated) DEVICE — SYRINGE IRRIG 60ML SFT PLIABLE BLB EZ TO GRP 1 HND USE W/

## (undated) DEVICE — SOLUTION IV 1000ML 0.9% SOD CHL PH 5 INJ USP VIAFLX PLAS